# Patient Record
Sex: MALE | Race: WHITE | NOT HISPANIC OR LATINO | Employment: FULL TIME | URBAN - METROPOLITAN AREA
[De-identification: names, ages, dates, MRNs, and addresses within clinical notes are randomized per-mention and may not be internally consistent; named-entity substitution may affect disease eponyms.]

---

## 2020-09-23 ENCOUNTER — OFFICE VISIT CONVERTED (OUTPATIENT)
Dept: SURGERY | Facility: CLINIC | Age: 55
End: 2020-09-23
Attending: NURSE PRACTITIONER

## 2021-01-11 ENCOUNTER — HOSPITAL ENCOUNTER (OUTPATIENT)
Dept: GASTROENTEROLOGY | Facility: HOSPITAL | Age: 56
Setting detail: HOSPITAL OUTPATIENT SURGERY
Discharge: HOME | End: 2021-01-11
Attending: SURGERY

## 2021-01-25 ENCOUNTER — OFFICE VISIT CONVERTED (OUTPATIENT)
Dept: SURGERY | Facility: CLINIC | Age: 56
End: 2021-01-25
Attending: SURGERY

## 2021-01-25 ENCOUNTER — CONVERSION ENCOUNTER (OUTPATIENT)
Dept: SURGERY | Facility: CLINIC | Age: 56
End: 2021-01-25

## 2021-05-10 NOTE — H&P
"   History and Physical      Patient Name: Aly Elise   Patient ID: [id number]   Sex: Male   YOB: 1965    Primary Care Provider: MELISSA SMITH MD   Referring Provider: MELISSA SMITH MD    Visit Date: January 25, 2021    Provider: Jersey Greene MD   Location: [facility]   Location Address: [address] [place] [address]  [id number]   Location Phone: [phone]          Chief Complaint  · Follow Up Office Visit      History Of Present Illness     Mr. Elise came back for follow-up. He had a colonoscopy done about a week ago. We removed some polyps. One of them was fairly large on the left side of the colon. The polyps all came back consistent with tubulovillous adenomas. The one on the left side actually had some high grade dysplasia. He is doing well. Otherwise, he feels fine.       Past Medical History  High blood pressure; High cholesterol; Hyperlipidemia; Hypertension; Kidney stones; Rectal bleeding; Vitamin D Deficiency         Past Surgical History  Kidney Stone Surgery, Unspecified; Tonsillectomy         Medication List  lisinopril 20 mg oral tablet; potassium citrate 15 mEq oral tablet extended release         Allergy List  NO KNOWN DRUG ALLERGIES         Family Medical History  Prostate cancer         Social History  Alcohol; ; Recreational Drug Use; Tobacco (Never)         Review of Systems  · Cardiovascular  o Denies  o : chest pain, irregular heart beats, rapid heart rate, chest pain on exertion, shortness of breath, lower extremity swelling  · Respiratory  o Denies  o : shortness of breath, wheezing, cough, wheezing, chronic cough, coughing up blood  · Gastrointestinal  o Denies  o : nausea, vomiting, diarrhea, chronic abdominal pain, reflux symptoms      Vitals  Date Time BP Position Site L\R Cuff Size HR RR TEMP (F) WT  HT  BMI kg/m2 BSA m2 O2 Sat FR L/min FiO2        01/25/2021 03:04 PM       18  263lbs 0oz 6'  1\" 34.7 2.48             Physical " Examination     Today on physical exam, he appears well. His abdomen is soft.           Assessment  · Postoperative Exam Following Surgery     V67.00       Status post colonoscopy. We did remove some sizeable polyps.       Plan  · Medications  o Medications have been Reconciled     We will have him follow-up in six months. We will schedule him for a short interval colonoscopy just to make sure the left sided polyp has been completely removed. I have described that plan to him and he is fine with that.             Electronically Signed by: Aydee Florez-, -Author on January 25, 2021 04:03:20 PM  Electronically Co-signed by: Jersey Greene MD -Reviewer on January 26, 2021 10:46:52 AM

## 2021-05-10 NOTE — H&P
History and Physical      Patient Name: Aly Elise   Patient ID: [id number]   Sex: Male   YOB: 1965    Primary Care Provider: Randall Colindres MD   Referring Provider: INEZ SMITH MD    Visit Date: September 23, 2020    Provider: FARZANA Pradhan   [facility] [facility]   Location Address: [address] [place] [address]  [id number]   Location Phone: [phone]          Chief Complaint  · Requesting colonoscopy  · Age 50 or over  · FH of colon cancer  · Rectal Bleeding      History Of Present Illness  The patient is a 55 year old /White male presenting to the Surgical Specialist office on a referral from INEZ SMITH MD.   Aly Elise needs to have a diagnostic colonoscopy.   Patient states that they have not had a colonoscopy.   Patient currently complains of: blood in stool and abnormal stool study   Patient Does have family history of colon cancer. Grandfather      Patient presents today on a referral from Dr. Inez Avilez for positive Cologuard.  Patient reports that he is seeing bright red blood per rectum occasionally with bowel movements, there is significant enough that it fills the toilet.  He denies any abdominal pain or change in bowel habit.  Admits to family history of colon cancer with his maternal grandfather.  No previous colonoscopy       Past Medical History  Disease Name Date Onset Notes   High blood pressure --  --    High cholesterol --  --    Hyperlipidemia --  --    Hypertension --  --    Kidney stones --  --    Rectal bleeding --  --    Vitamin D Deficiency --  --          Past Surgical History  Procedure Name Date Notes   Kidney Stone Surgery, Unspecified --  --    Tonsillectomy --  --          Medication List  Name Date Started Instructions   lisinopril 20 mg oral tablet  --    potassium citrate 15 mEq oral tablet extended release  --          Allergy List  Allergen Name Date Reaction Notes   NO KNOWN DRUG ALLERGIES --  --   "--        Allergies Reconciled  Family Medical History  Disease Name Relative/Age Notes   Prostate cancer Father/  Grandfather (paternal)/   Father; Grandfather (paternal)         Social History  Finding Status Start/Stop Quantity Notes   Alcohol --  --/-- --  --     --  --/-- --  --    Recreational Drug Use --  --/-- --  --    Tobacco Never --/-- --  --          Review of Systems  · Constitutional  o Denies  o : fever, chills  · Eyes  o Denies  o : yellowish discoloration of eyes  · HENT  o Denies  o : difficulty swallowing  · Cardiovascular  o Denies  o : chest pain, chest pain on exertion  · Respiratory  o Denies  o : shortness of breath  · Gastrointestinal  o Denies  o : nausea, vomiting, diarrhea, constipation  · Genitourinary  o Denies  o : abnormal color of urine  · Integument  o Denies  o : rash  · Neurologic  o Denies  o : tingling or numbness  · Musculoskeletal  o Denies  o : joint pain  · Endocrine  o Denies  o : weight gain, weight loss      Vitals  Date Time BP Position Site L\R Cuff Size HR RR TEMP (F) WT  HT  BMI kg/m2 BSA m2 O2 Sat        09/23/2020 11:58 AM       12  264lbs 2oz 6'  1\" 34.85 2.48           Physical Examination  · Constitutional  o Appearance  o : well developed, well-nourished, patient in no apparent distress  · Head and Face  o Head  o :   § Inspection  § : atraumatic, normocephalic  o Face  o :   § Inspection  § : no facial lesions  · Eyes  o Conjunctivae  o : conjunctivae normal  o Sclerae  o : sclerae white  · Neck  o Inspection/Palpation  o : normal appearance, no masses or tenderness, trachea midline  · Respiratory  o Respiratory Effort  o : breathing unlabored  · Skin and Subcutaneous Tissue  o General Inspection  o : no lesions present, no areas of discoloration, skin turgor normal, texture normal  · Neurologic  o Mental Status Examination  o :   § Orientation  § : grossly oriented to person, place and time  § Attention  § : attention normal, concentration abilities " normal  § Fund of Knowledge  § : fund of knowledge within normal limits, patient aware of current events  o Gait and Station  o : normal gait, able to stand without difficulty  · Psychiatric  o Judgement and Insight  o : judgment and insight intact  o Mood and Affect  o : mood normal, affect appropriate          Assessment  · Abnormal stool test     792.1/R19.5  · Rectal Bleeding     569.3/K62.5  · Pre-op testing     V72.84/Z01.818    Problems Reconciled  Plan  · Orders  o Consent for Colonoscopy with Possible Biopsy - Possible risks/complications, benefits, and alternatives to surgical or invasive procedure have been explained to patient and/or legal guardian. -Patient has been evaluated and can tolerate anesthesia and/or sedation. Risks, benefits, and alternatives to anesthesia and sedation have been explained to patient and/or legal guardian. (12034) - 792.1/R19.5, 569.3/K62.5, V72.84/Z01.818 - 12/14/2020  o [facility] Pre-Op Covid-19 Screening (98288) - V72.84/Z01.818 - 12/09/2020   [address] @ 9:30am  · Medications  o Medications have been Reconciled  o Transition of Care or Provider Policy  · Instructions  o Surgical Facility: [facility]  o Handouts Provided Pre-Procedure Instructions including date, time, and location of procedure.   o PLAN: Proceeed with colonoscopy. Patient understands risks/benefits and is willing to proceed.   o ***Surgical Orders***  o RISK AND BENEFITS:  o Given these options, the patient has verbally expressed an understanding of the risks of the surgery and finds these risks acceptable. Will proceed with surgery as soon as possible.  o O.R. PREP: Per protocol   o IV: Per Anesthesia  o Please sign permit for: Colonoscopy with possible biopsies by Dr. Greene.  o The above History and Physical Examination has been completed within 30 days of admission.  o ***Patient Status***  o Outpatient  o Follow up in the in the office post procedure.  o Advised patient he would  need COVID-19 testing prior to procedure. Encourage patient to self isolate in between testing and procedure. Patient verbalizes understands willing to proceed  o Electronically Identified Patient Education Materials Provided Electronically            Electronically Signed by: FARZANA Pradhan -Author on September 23, 2020 01:27:23 PM

## 2021-05-14 VITALS — WEIGHT: 263 LBS | RESPIRATION RATE: 18 BRPM | HEIGHT: 73 IN | BODY MASS INDEX: 34.85 KG/M2

## 2021-05-14 VITALS — BODY MASS INDEX: 35 KG/M2 | HEIGHT: 73 IN | WEIGHT: 264.12 LBS | RESPIRATION RATE: 12 BRPM

## 2021-06-15 ENCOUNTER — TELEPHONE (OUTPATIENT)
Dept: SURGERY | Facility: CLINIC | Age: 56
End: 2021-06-15

## 2021-06-15 NOTE — TELEPHONE ENCOUNTER
Patient called to schedule a follow-up colonoscopy in July.  There is a recall in [facility] placed by Raissa that he just needs to schedule the procedure, and that he doesn't need a consultation.

## 2021-06-21 NOTE — TELEPHONE ENCOUNTER
Per Dr. Greene, will you please tell pt he still has to schedule an appt to speak with Dr. Greene before the colonoscopy. Once this is done, will you please send back to me?

## 2021-07-21 RX ORDER — METFORMIN HYDROCHLORIDE 500 MG/1
500 TABLET, EXTENDED RELEASE ORAL DAILY
COMMUNITY
Start: 2021-04-13

## 2021-07-21 RX ORDER — ATENOLOL 25 MG/1
TABLET ORAL
COMMUNITY

## 2021-07-21 RX ORDER — POTASSIUM CITRATE 15 MEQ/1
TABLET, EXTENDED RELEASE ORAL
Status: ON HOLD | COMMUNITY
End: 2021-12-06

## 2021-07-21 RX ORDER — POTASSIUM CITRATE 5 MEQ/1
TABLET, EXTENDED RELEASE ORAL
COMMUNITY
Start: 2021-05-19

## 2021-07-21 RX ORDER — ERGOCALCIFEROL 1.25 MG/1
CAPSULE ORAL
Status: ON HOLD | COMMUNITY
End: 2021-12-06

## 2021-07-21 RX ORDER — ATORVASTATIN CALCIUM 40 MG/1
40 TABLET, FILM COATED ORAL DAILY
COMMUNITY
Start: 2021-04-13

## 2021-07-21 RX ORDER — LISINOPRIL 20 MG/1
TABLET ORAL
Status: ON HOLD | COMMUNITY
End: 2021-12-06

## 2021-07-26 ENCOUNTER — OFFICE VISIT (OUTPATIENT)
Dept: SURGERY | Facility: CLINIC | Age: 56
End: 2021-07-26

## 2021-07-26 VITALS — WEIGHT: 254.6 LBS | BODY MASS INDEX: 33.74 KG/M2 | HEIGHT: 73 IN | RESPIRATION RATE: 14 BRPM

## 2021-07-26 DIAGNOSIS — Z12.11 COLON CANCER SCREENING: Primary | ICD-10-CM

## 2021-07-26 DIAGNOSIS — Z86.010 HX OF ADENOMATOUS COLONIC POLYPS: ICD-10-CM

## 2021-07-26 PROBLEM — I10 HIGH BLOOD PRESSURE: Status: ACTIVE | Noted: 2021-07-26

## 2021-07-26 PROBLEM — E11.9 TYPE 2 DIABETES MELLITUS WITHOUT COMPLICATION, WITHOUT LONG-TERM CURRENT USE OF INSULIN: Status: ACTIVE | Noted: 2021-05-13

## 2021-07-26 PROBLEM — E66.09 CLASS 1 OBESITY DUE TO EXCESS CALORIES WITH SERIOUS COMORBIDITY AND BODY MASS INDEX (BMI) OF 34.0 TO 34.9 IN ADULT: Status: ACTIVE | Noted: 2021-05-13

## 2021-07-26 PROBLEM — K62.5 RECTAL BLEEDING: Status: ACTIVE | Noted: 2021-07-26

## 2021-07-26 PROBLEM — N13.2 HYDRONEPHROSIS WITH RENAL CALCULOUS OBSTRUCTION: Status: ACTIVE | Noted: 2020-07-22

## 2021-07-26 PROBLEM — E55.9 VITAMIN D DEFICIENCY: Status: ACTIVE | Noted: 2021-07-26

## 2021-07-26 PROBLEM — E78.5 HYPERLIPIDEMIA: Status: ACTIVE | Noted: 2021-07-26

## 2021-07-26 PROBLEM — N20.0 KIDNEY STONES: Status: ACTIVE | Noted: 2021-07-26

## 2021-07-26 PROBLEM — I10 ESSENTIAL HYPERTENSION: Status: ACTIVE | Noted: 2021-05-13

## 2021-07-26 PROBLEM — E78.00 HIGH CHOLESTEROL: Status: ACTIVE | Noted: 2021-07-26

## 2021-07-26 PROCEDURE — S0285 CNSLT BEFORE SCREEN COLONOSC: HCPCS | Performed by: SURGERY

## 2021-07-26 RX ORDER — LISINOPRIL 10 MG/1
10 TABLET ORAL DAILY
COMMUNITY

## 2021-07-26 RX ORDER — LOSARTAN POTASSIUM 25 MG/1
25 TABLET ORAL DAILY
COMMUNITY
Start: 2021-07-14

## 2021-07-26 RX ORDER — AMLODIPINE BESYLATE 10 MG/1
10 TABLET ORAL DAILY
COMMUNITY
Start: 2021-07-14

## 2021-07-26 RX ORDER — AMLODIPINE BESYLATE 10 MG/1
TABLET ORAL
Status: ON HOLD | COMMUNITY
Start: 2021-03-09 | End: 2021-12-06

## 2021-07-26 NOTE — PROGRESS NOTES
Inpatient History and Physical Surgical Orders    Preadmission Location:   Preadmission Time:  Facility:  Surgery Date:  Surgery Time:  Preadmission Test date:     Chief Complaint  Outpatient History and Physical / Surgical Orders    Primary Care Provider: Inez Engel APRN    Referring Provider: No ref. provider found    Subjective      Patient Name: Aly Elise : 1965    HPI  Patient is a 55-year-old gentleman who is about 6 months out status post a colonoscopy with polypectomy.  He had a large sessile polyp removed piecemeal from the right colon and this came back consistent with a tubular villous adenoma with some high-grade dysplasia.  He is doing fine but we wanted to bring him back for short interval colonoscopy just to make sure that the polyps been completely excised.    Past History:  Medical History: has a past medical history of High blood pressure, High cholesterol, Hyperlipidemia, Hypertension, Kidney stones, Rectal bleeding, and Vitamin D deficiency.   Surgical History: has a past surgical history that includes Kidney stone surgery and Tonsillectomy.   Family History: family history includes Prostate cancer in his father and paternal grandfather.   Social History: reports that he has never smoked. He does not have any smokeless tobacco history on file.  Allergies: Patient has no known allergies.       Current Outpatient Medications:   •  amLODIPine (NORVASC) 10 MG tablet, , Disp: , Rfl:   •  atenolol (TENORMIN) 25 MG tablet, atenolol 25 mg oral tablet take 0.5 tablet by oral route 2 times a day   Suspended, Disp: , Rfl:   •  atorvastatin (LIPITOR) 40 MG tablet, Take 40 mg by mouth Daily., Disp: , Rfl:   •  ergocalciferol (ERGOCALCIFEROL) 1.25 MG (06967 UT) capsule, Vitamin D2 50,000 unit oral capsule 1 po every 2 weeks   Suspended, Disp: , Rfl:   •  lisinopril (PRINIVIL,ZESTRIL) 20 MG tablet, , Disp: , Rfl:   •  losartan (COZAAR) 25 MG tablet, , Disp: , Rfl:   •  metFORMIN ER  "(GLUCOPHAGE-XR) 500 MG 24 hr tablet, Take 500 mg by mouth Daily., Disp: , Rfl:   •  potassium citrate (UROCIT-K) 5 MEQ (540 MG) CR tablet, TAKE 2 TABLETS (10 MEQ TOTAL) BY MOUTH TWO TIMES DAILY (GENERIC FOR UROCIT-K 540MG 5MEQ), Disp: , Rfl:   •  amLODIPine (NORVASC) 10 MG tablet, , Disp: , Rfl:   •  lisinopril (PRINIVIL,ZESTRIL) 10 MG tablet, Take 10 mg by mouth Daily., Disp: , Rfl:   •  Potassium Citrate ER 15 MEQ (1620 MG) tablet controlled-release, , Disp: , Rfl:        Objective   Vital Signs:   Resp 14   Ht 185.4 cm (73\")   Wt 115 kg (254 lb 9.6 oz)   BMI 33.59 kg/m²       Physical Exam  Vitals and nursing note reviewed.   Constitutional:       Appearance: Normal appearance. He is well-developed.   Cardiovascular:      Rate and Rhythm: Normal rate and regular rhythm.   Pulmonary:      Effort: Pulmonary effort is normal.      Breath sounds: Normal air entry.   Abdominal:      General: Bowel sounds are normal.      Palpations: Abdomen is soft.      Skin:     General: Skin is warm and dry.   Neurological:      Mental Status: He is alert and oriented to person, place, and time.      Motor: Motor function is intact.   Psychiatric:         Mood and Affect: Mood normal.       Result Review :               Assessment and Plan   Diagnoses and all orders for this visit:    1. Hx of adenomatous colonic polyps (Primary)    We will set him up for repeat colonoscopy.  I described the procedure to him as well as the risk and benefits and he is agreeable to proceeding.    I  Jersey Greene MD  07/26/2021    "

## 2021-10-06 ENCOUNTER — TELEPHONE (OUTPATIENT)
Dept: SURGERY | Facility: CLINIC | Age: 56
End: 2021-10-06

## 2021-12-02 NOTE — PRE-PROCEDURE INSTRUCTIONS
Called patient and discussed instructions for laxative and skin prep. Discussed clear liquid diet and pre-op medications. Patient take all of his meds at night time and aware to hold his metformin Den night. Patient stated understanding. No other issues or concerns noted currently per patient.   Blood glucose ordered.  Patient is vaccinated for covid-19.

## 2021-12-06 ENCOUNTER — ANESTHESIA (OUTPATIENT)
Dept: GASTROENTEROLOGY | Facility: HOSPITAL | Age: 56
End: 2021-12-06

## 2021-12-06 ENCOUNTER — HOSPITAL ENCOUNTER (OUTPATIENT)
Facility: HOSPITAL | Age: 56
Setting detail: HOSPITAL OUTPATIENT SURGERY
Discharge: HOME | End: 2021-12-06
Attending: SURGERY | Admitting: SURGERY

## 2021-12-06 ENCOUNTER — ANESTHESIA EVENT (OUTPATIENT)
Dept: GASTROENTEROLOGY | Facility: HOSPITAL | Age: 56
End: 2021-12-06

## 2021-12-06 VITALS
BODY MASS INDEX: 34.48 KG/M2 | WEIGHT: 260.14 LBS | TEMPERATURE: 98.1 F | RESPIRATION RATE: 18 BRPM | HEIGHT: 73 IN | OXYGEN SATURATION: 99 % | DIASTOLIC BLOOD PRESSURE: 78 MMHG | SYSTOLIC BLOOD PRESSURE: 140 MMHG | HEART RATE: 65 BPM

## 2021-12-06 DIAGNOSIS — Z12.11 COLON CANCER SCREENING: ICD-10-CM

## 2021-12-06 DIAGNOSIS — Z86.010 HX OF ADENOMATOUS COLONIC POLYPS: ICD-10-CM

## 2021-12-06 LAB — GLUCOSE BLDC GLUCOMTR-MCNC: 138 MG/DL (ref 70–99)

## 2021-12-06 PROCEDURE — 25010000002 PROPOFOL 10 MG/ML EMULSION: Performed by: NURSE ANESTHETIST, CERTIFIED REGISTERED

## 2021-12-06 PROCEDURE — 82962 GLUCOSE BLOOD TEST: CPT

## 2021-12-06 PROCEDURE — 88305 TISSUE EXAM BY PATHOLOGIST: CPT | Performed by: SURGERY

## 2021-12-06 RX ORDER — SODIUM CHLORIDE, SODIUM LACTATE, POTASSIUM CHLORIDE, CALCIUM CHLORIDE 600; 310; 30; 20 MG/100ML; MG/100ML; MG/100ML; MG/100ML
30 INJECTION, SOLUTION INTRAVENOUS CONTINUOUS
Status: DISCONTINUED | OUTPATIENT
Start: 2021-12-06 | End: 2021-12-06 | Stop reason: HOSPADM

## 2021-12-06 RX ORDER — SODIUM CHLORIDE, SODIUM LACTATE, POTASSIUM CHLORIDE, CALCIUM CHLORIDE 600; 310; 30; 20 MG/100ML; MG/100ML; MG/100ML; MG/100ML
INJECTION, SOLUTION INTRAVENOUS CONTINUOUS PRN
Status: DISCONTINUED | OUTPATIENT
Start: 2021-12-06 | End: 2021-12-06 | Stop reason: SURG

## 2021-12-06 RX ORDER — ONDANSETRON 4 MG/1
4 TABLET, FILM COATED ORAL ONCE AS NEEDED
Status: DISCONTINUED | OUTPATIENT
Start: 2021-12-06 | End: 2021-12-06 | Stop reason: HOSPADM

## 2021-12-06 RX ORDER — PROPOFOL 10 MG/ML
VIAL (ML) INTRAVENOUS AS NEEDED
Status: DISCONTINUED | OUTPATIENT
Start: 2021-12-06 | End: 2021-12-06 | Stop reason: SURG

## 2021-12-06 RX ORDER — ONDANSETRON 2 MG/ML
4 INJECTION INTRAMUSCULAR; INTRAVENOUS ONCE AS NEEDED
Status: DISCONTINUED | OUTPATIENT
Start: 2021-12-06 | End: 2021-12-06 | Stop reason: HOSPADM

## 2021-12-06 RX ORDER — LIDOCAINE HYDROCHLORIDE 20 MG/ML
INJECTION, SOLUTION INFILTRATION; PERINEURAL AS NEEDED
Status: DISCONTINUED | OUTPATIENT
Start: 2021-12-06 | End: 2021-12-06 | Stop reason: SURG

## 2021-12-06 RX ADMIN — PROPOFOL 200 MCG/KG/MIN: 10 INJECTION, EMULSION INTRAVENOUS at 08:15

## 2021-12-06 RX ADMIN — SODIUM CHLORIDE, POTASSIUM CHLORIDE, SODIUM LACTATE AND CALCIUM CHLORIDE: 600; 310; 30; 20 INJECTION, SOLUTION INTRAVENOUS at 08:10

## 2021-12-06 RX ADMIN — SODIUM CHLORIDE, POTASSIUM CHLORIDE, SODIUM LACTATE AND CALCIUM CHLORIDE 30 ML/HR: 600; 310; 30; 20 INJECTION, SOLUTION INTRAVENOUS at 07:10

## 2021-12-06 RX ADMIN — LIDOCAINE HYDROCHLORIDE 100 MG: 20 INJECTION, SOLUTION INFILTRATION; PERINEURAL at 08:15

## 2021-12-06 RX ADMIN — PROPOFOL 100 MG: 10 INJECTION, EMULSION INTRAVENOUS at 08:15

## 2021-12-06 NOTE — H&P
[facility]   HISTORY AND PHYSICAL    Patient Name: Aly Elise  : 1965  MRN: [id number]  Primary Care Physician:  Inez Engel APRN  Date of admission: 2021    Subjective   Subjective     Chief Complaint: Colon screening    HPI:    Aly Elise is a 56 y.o. male who presents for short interval colonoscopy for colon screening.    Review of Systems   Respiratory: Negative for shortness of breath.    Cardiovascular: Negative for chest pain.       Personal History     Past Medical History:   Diagnosis Date   • Diabetes mellitus (HCC)    • High blood pressure    • High cholesterol    • Hyperlipidemia    • Hypertension    • Kidney stones    • Rectal bleeding    • Vitamin D deficiency        Past Surgical History:   Procedure Laterality Date   • KIDNEY STONE SURGERY     • TONSILLECTOMY         Family History: family history includes Prostate cancer in his father and paternal grandfather. Otherwise pertinent FHx was reviewed and not pertinent to current issue.    Social History:  reports that he has never smoked. He has never used smokeless tobacco. He reports that he does not drink alcohol and does not use drugs.    Home Medications:  amLODIPine, atenolol, atorvastatin, lisinopril, losartan, metFORMIN ER, and potassium citrate    Allergies:  No Known Allergies    Objective    Objective     Vitals:   Temp:  [98.1 °F (36.7 °C)] 98.1 °F (36.7 °C)  Heart Rate:  [68] 68  Resp:  [18] 18  BP: (136)/(85) 136/85    Physical Exam  HENT:      Head: Normocephalic.   Cardiovascular:      Rate and Rhythm: Normal rate.   Pulmonary:      Effort: Pulmonary effort is normal.   Abdominal:      Palpations: Abdomen is soft.   Musculoskeletal:      Cervical back: Normal range of motion.   Skin:     General: Skin is warm.   Neurological:      General: No focal deficit present.      Mental Status: He is alert.   Psychiatric:         Mood and Affect: Mood normal.         Result Review    Result Review:  I have  personally reviewed the results from the time of this admission to 12/6/2021 07:22 EST and agree with these findings:  []  Laboratory  []  Microbiology  []  Radiology  []  EKG/Telemetry   []  Cardiology/Vascular   []  Pathology  []  Old records  []  Other:  Most notable findings include:     Assessment/Plan   Assessment / Plan     Brief Patient Summary:  Aly Elise is a 56 y.o. male who presents for screening colonoscopy.    Active Hospital Problems:  Active Hospital Problems    Diagnosis    • Hx of adenomatous colonic polyps    • Colon cancer screening      Added automatically from request for surgery [id number]         Plan:   Will proceed with colonoscopy.  R/B/A's explained.    DVT prophylaxis:  No DVT prophylaxis order currently exists.    CODE STATUS:         Admission Status:  I believe this patient meets outpatient. status.    Electronically signed by Jersey Greene MD, 12/06/21, 7:22 AM EST.

## 2021-12-06 NOTE — ANESTHESIA PREPROCEDURE EVALUATION
Anesthesia Evaluation     Patient summary reviewed and Nursing notes reviewed                Airway   Mallampati: I  TM distance: >3 FB  Neck ROM: full  No difficulty expected  Dental      Pulmonary - negative pulmonary ROS and normal exam    breath sounds clear to auscultation  Cardiovascular - normal exam    Rhythm: regular    (+) hypertension, hyperlipidemia,       Neuro/Psych- negative ROS  GI/Hepatic/Renal/Endo    (+) obesity,  GI bleeding , renal disease, diabetes mellitus,     Musculoskeletal (-) negative ROS    Abdominal    Substance History - negative use     OB/GYN negative ob/gyn ROS         Other                        Anesthesia Plan    ASA 3     general     intravenous induction     Anesthetic plan, all risks, benefits, and alternatives have been provided, discussed and informed consent has been obtained with: patient.

## 2021-12-06 NOTE — ANESTHESIA POSTPROCEDURE EVALUATION
Patient: Aly Elise    Procedure Summary     Date: 12/06/21 Room / Location: [facility] ENDOSCOPY 3 / [facility] ENDOSCOPY    Anesthesia Start: 0810 Anesthesia Stop: 0833    Procedure: COLONOSCOPY WITH POLYPECTOMY/SNARE (N/A ) Diagnosis:       Hx of adenomatous colonic polyps      Colon cancer screening      (Hx of adenomatous colonic polyps [Z86.010])      (Colon cancer screening [Z12.11])    Surgeons: Jersey Greene MD Provider: Antony Slater MD    Anesthesia Type: general ASA Status: 3          Anesthesia Type: general    Vitals  Vitals Value Taken Time   /76 12/06/21 0847   Temp     Pulse 62 12/06/21 0847   Resp 16 12/06/21 0845   SpO2 93 % 12/06/21 0847   Vitals shown include unvalidated device data.        Post Anesthesia Care and Evaluation    Patient location during evaluation: bedside  Patient participation: complete - patient participated  Level of consciousness: awake  Pain management: adequate  Airway patency: patent  Anesthetic complications: No anesthetic complications  PONV Status: none  Cardiovascular status: acceptable  Respiratory status: acceptable  Hydration status: acceptable    Comments: An Anesthesiologist personally participated in the most demanding procedures (including induction and emergence if applicable) in the anesthesia plan, monitored the course of anesthesia administration at frequent intervals and remained physically present and available for immediate diagnosis and treatment of emergencies.

## 2021-12-07 LAB
CYTO UR: NORMAL
LAB AP CASE REPORT: NORMAL
LAB AP CLINICAL INFORMATION: NORMAL
PATH REPORT.FINAL DX SPEC: NORMAL
PATH REPORT.GROSS SPEC: NORMAL

## 2021-12-28 ENCOUNTER — OFFICE VISIT (OUTPATIENT)
Dept: SURGERY | Facility: CLINIC | Age: 56
End: 2021-12-28

## 2021-12-28 VITALS — RESPIRATION RATE: 16 BRPM | BODY MASS INDEX: 34.48 KG/M2 | HEIGHT: 73 IN | WEIGHT: 260.14 LBS

## 2021-12-28 DIAGNOSIS — Z86.010 HX OF ADENOMATOUS COLONIC POLYPS: Primary | ICD-10-CM

## 2021-12-28 PROCEDURE — 99212 OFFICE O/P EST SF 10 MIN: CPT | Performed by: SURGERY

## 2021-12-28 NOTE — PROGRESS NOTES
Chief Complaint  Follow-up    Subjective          Aly Elise presents to [facility] GENERAL SURGERY  History of Present Illness    Aly Elise is a 56 y.o. male  who presents today for a postoperative visit.     Patient is here for a follow-up after a recent colonoscopy.  We found 2 small polyps that were removed that came back consistent with a tubular adenoma and a small sessile serrated lesion.    Past History:  Medical History: has a past medical history of Diabetes mellitus (HCC), High blood pressure, High cholesterol, Hyperlipidemia, Hypertension, Kidney stones, Rectal bleeding, and Vitamin D deficiency.   Surgical History: has a past surgical history that includes Kidney stone surgery; Tonsillectomy; and Colonoscopy (N/A, 12/6/2021).   Family History: family history includes Prostate cancer in his father and paternal grandfather.   Social History: reports that he has never smoked. He has never used smokeless tobacco. He reports that he does not drink alcohol and does not use drugs.  Allergies: Patient has no known allergies.       Current Outpatient Medications:   •  amLODIPine (NORVASC) 10 MG tablet, Take 10 mg by mouth Daily., Disp: , Rfl:   •  atenolol (TENORMIN) 25 MG tablet, atenolol 25 mg oral tablet take 0.5 tablet by oral route 2 times a day   Suspended, Disp: , Rfl:   •  atorvastatin (LIPITOR) 40 MG tablet, Take 40 mg by mouth Daily., Disp: , Rfl:   •  lisinopril (PRINIVIL,ZESTRIL) 10 MG tablet, Take 10 mg by mouth Daily., Disp: , Rfl:   •  losartan (COZAAR) 25 MG tablet, Take 25 mg by mouth Daily., Disp: , Rfl:   •  metFORMIN ER (GLUCOPHAGE-XR) 500 MG 24 hr tablet, Take 500 mg by mouth Daily., Disp: , Rfl:   •  potassium citrate (UROCIT-K) 5 MEQ (540 MG) CR tablet, TAKE 2 TABLETS (10 MEQ TOTAL) BY MOUTH TWO TIMES DAILY (GENERIC FOR UROCIT-K 540MG 5MEQ), Disp: , Rfl:        Physical Exam  He appears well today and his abdomen is soft  Objective     Vital Signs:   Resp 16   " Ht 185.4 cm (72.99\")   Wt 118 kg (260 lb 2.3 oz)   BMI 34.33 kg/m²              Assessment and Plan    Diagnoses and all orders for this visit:    1. Hx of adenomatous colonic polyps (Primary)    We will see him back in 5 years and repeat a colonoscopy at that time.  I have asked him to call me in the meantime should he have any problems.      "

## 2023-01-03 ENCOUNTER — APPOINTMENT (OUTPATIENT)
Dept: CT IMAGING | Facility: HOSPITAL | Age: 58
End: 2023-01-03
Payer: COMMERCIAL

## 2023-01-03 ENCOUNTER — APPOINTMENT (OUTPATIENT)
Dept: GENERAL RADIOLOGY | Facility: HOSPITAL | Age: 58
End: 2023-01-03
Payer: COMMERCIAL

## 2023-01-03 ENCOUNTER — HOSPITAL ENCOUNTER (EMERGENCY)
Facility: HOSPITAL | Age: 58
Discharge: HOME | End: 2023-01-03
Attending: EMERGENCY MEDICINE | Admitting: EMERGENCY MEDICINE
Payer: COMMERCIAL

## 2023-01-03 VITALS
OXYGEN SATURATION: 99 % | WEIGHT: 274.69 LBS | DIASTOLIC BLOOD PRESSURE: 91 MMHG | TEMPERATURE: 98 F | HEIGHT: 73 IN | SYSTOLIC BLOOD PRESSURE: 150 MMHG | RESPIRATION RATE: 20 BRPM | BODY MASS INDEX: 36.41 KG/M2 | HEART RATE: 88 BPM
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
## 2023-01-03 DIAGNOSIS — S50.811A ABRASION OF RIGHT FOREARM, INITIAL ENCOUNTER: ICD-10-CM

## 2023-01-03 DIAGNOSIS — S01.01XA LACERATION OF SCALP, INITIAL ENCOUNTER: ICD-10-CM

## 2023-01-03 DIAGNOSIS — S09.90XA CLOSED HEAD INJURY, INITIAL ENCOUNTER: ICD-10-CM

## 2023-01-03 DIAGNOSIS — M79.642 LEFT HAND PAIN: ICD-10-CM

## 2023-01-03 LAB
HOLD SPECIMEN: NORMAL
HOLD SPECIMEN: NORMAL
WHOLE BLOOD HOLD COAG: NORMAL
WHOLE BLOOD HOLD SPECIMEN: NORMAL

## 2023-01-03 PROCEDURE — 73090 X-RAY EXAM OF FOREARM: CPT

## 2023-01-03 PROCEDURE — 0 LIDOCAINE 1 % SOLUTION: Performed by: NURSE PRACTITIONER

## 2023-01-03 PROCEDURE — 72125 CT NECK SPINE W/O DYE: CPT

## 2023-01-03 PROCEDURE — 73130 X-RAY EXAM OF HAND: CPT

## 2023-01-03 PROCEDURE — 70450 CT HEAD/BRAIN W/O DYE: CPT

## 2023-01-03 PROCEDURE — 71046 X-RAY EXAM CHEST 2 VIEWS: CPT

## 2023-01-03 PROCEDURE — 99283 EMERGENCY DEPT VISIT LOW MDM: CPT

## 2023-01-03 RX ORDER — ACETAMINOPHEN 500 MG
1000 TABLET ORAL ONCE
Status: COMPLETED | OUTPATIENT
Start: 2023-01-03 | End: 2023-01-03

## 2023-01-03 RX ORDER — SODIUM CHLORIDE 0.9 % (FLUSH) 0.9 %
10 SYRINGE (ML) INJECTION AS NEEDED
Status: DISCONTINUED | OUTPATIENT
Start: 2023-01-03 | End: 2023-01-03 | Stop reason: HOSPADM

## 2023-01-03 RX ORDER — LIDOCAINE HYDROCHLORIDE 10 MG/ML
10 INJECTION, SOLUTION INFILTRATION; PERINEURAL ONCE
Status: COMPLETED | OUTPATIENT
Start: 2023-01-03 | End: 2023-01-03

## 2023-01-03 RX ADMIN — ACETAMINOPHEN 1000 MG: 500 TABLET, FILM COATED ORAL at 09:57

## 2023-01-03 RX ADMIN — LIDOCAINE HYDROCHLORIDE 10 ML: 10 INJECTION, SOLUTION INFILTRATION; PERINEURAL at 11:08

## 2023-01-03 NOTE — DISCHARGE INSTRUCTIONS
You should expect to be sore for several days.  You can take Tylenol or Ibuprofen for pain.  Have staples removed in 5-7 days.  Return to ED for severe headache, altered mental status, vision changes, vomiting, or any other concerns.
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
## 2023-01-03 NOTE — ED NOTES
Pt ambulated to restroom and he had asked for tylenol for headache/pain. Tylenol order obtained and was given orally.

## 2023-01-03 NOTE — ED NOTES
Pt discharged home, he ambulated out of ED wound care discussed with patient and family and they verbalized understanding of all.

## 2024-05-09 ENCOUNTER — APPOINTMENT (OUTPATIENT)
Dept: CT IMAGING | Facility: HOSPITAL | Age: 59
End: 2024-05-09
Payer: COMMERCIAL

## 2024-05-09 ENCOUNTER — APPOINTMENT (OUTPATIENT)
Dept: GENERAL RADIOLOGY | Facility: HOSPITAL | Age: 59
End: 2024-05-09
Payer: COMMERCIAL

## 2024-05-09 ENCOUNTER — HOSPITAL ENCOUNTER (OUTPATIENT)
Facility: HOSPITAL | Age: 59
Setting detail: OBSERVATION
Discharge: HOME | End: 2024-05-10
Attending: EMERGENCY MEDICINE | Admitting: STUDENT IN AN ORGANIZED HEALTH CARE EDUCATION/TRAINING PROGRAM
Payer: COMMERCIAL

## 2024-05-09 DIAGNOSIS — M48.9 CERVICAL SPINE DISEASE: ICD-10-CM

## 2024-05-09 DIAGNOSIS — G45.9 TIA (TRANSIENT ISCHEMIC ATTACK): Primary | ICD-10-CM

## 2024-05-09 DIAGNOSIS — R26.2 DIFFICULTY IN WALKING: ICD-10-CM

## 2024-05-09 LAB
ALBUMIN SERPL-MCNC: 4.3 G/DL (ref 3.5–5.2)
ALBUMIN/GLOB SERPL: 1.5 G/DL
ALP SERPL-CCNC: 85 U/L (ref 39–117)
ALT SERPL W P-5'-P-CCNC: 35 U/L (ref 1–41)
ANION GAP SERPL CALCULATED.3IONS-SCNC: 11.4 MMOL/L (ref 5–15)
AST SERPL-CCNC: 25 U/L (ref 1–40)
BASOPHILS # BLD AUTO: 0.07 10*3/MM3 (ref 0–0.2)
BASOPHILS NFR BLD AUTO: 0.9 % (ref 0–1.5)
BILIRUB SERPL-MCNC: 0.6 MG/DL (ref 0–1.2)
BILIRUB UR QL STRIP: NEGATIVE
BUN SERPL-MCNC: 14 MG/DL (ref 6–20)
BUN/CREAT SERPL: 15.1 (ref 7–25)
CALCIUM SPEC-SCNC: 9.5 MG/DL (ref 8.6–10.5)
CHLORIDE SERPL-SCNC: 104 MMOL/L (ref 98–107)
CLARITY UR: CLEAR
CO2 SERPL-SCNC: 24.6 MMOL/L (ref 22–29)
COLOR UR: YELLOW
CREAT SERPL-MCNC: 0.93 MG/DL (ref 0.76–1.27)
DEPRECATED RDW RBC AUTO: 36.7 FL (ref 37–54)
EGFRCR SERPLBLD CKD-EPI 2021: 95.8 ML/MIN/1.73
EOSINOPHIL # BLD AUTO: 0.14 10*3/MM3 (ref 0–0.4)
EOSINOPHIL NFR BLD AUTO: 1.7 % (ref 0.3–6.2)
ERYTHROCYTE [DISTWIDTH] IN BLOOD BY AUTOMATED COUNT: 12.1 % (ref 12.3–15.4)
GLOBULIN UR ELPH-MCNC: 2.8 GM/DL
GLUCOSE SERPL-MCNC: 125 MG/DL (ref 65–99)
GLUCOSE UR STRIP-MCNC: ABNORMAL MG/DL
HCT VFR BLD AUTO: 40.6 % (ref 37.5–51)
HGB BLD-MCNC: 14.3 G/DL (ref 13–17.7)
HGB UR QL STRIP.AUTO: NEGATIVE
HOLD SPECIMEN: NORMAL
HOLD SPECIMEN: NORMAL
IMM GRANULOCYTES # BLD AUTO: 0.01 10*3/MM3 (ref 0–0.05)
IMM GRANULOCYTES NFR BLD AUTO: 0.1 % (ref 0–0.5)
KETONES UR QL STRIP: ABNORMAL
LEUKOCYTE ESTERASE UR QL STRIP.AUTO: NEGATIVE
LYMPHOCYTES # BLD AUTO: 2.81 10*3/MM3 (ref 0.7–3.1)
LYMPHOCYTES NFR BLD AUTO: 34.9 % (ref 19.6–45.3)
MAGNESIUM SERPL-MCNC: 1.9 MG/DL (ref 1.6–2.6)
MCH RBC QN AUTO: 29.7 PG (ref 26.6–33)
MCHC RBC AUTO-ENTMCNC: 35.2 G/DL (ref 31.5–35.7)
MCV RBC AUTO: 84.2 FL (ref 79–97)
MONOCYTES # BLD AUTO: 0.68 10*3/MM3 (ref 0.1–0.9)
MONOCYTES NFR BLD AUTO: 8.4 % (ref 5–12)
NEUTROPHILS NFR BLD AUTO: 4.34 10*3/MM3 (ref 1.7–7)
NEUTROPHILS NFR BLD AUTO: 54 % (ref 42.7–76)
NITRITE UR QL STRIP: NEGATIVE
NRBC BLD AUTO-RTO: 0 /100 WBC (ref 0–0.2)
PH UR STRIP.AUTO: 6.5 [PH] (ref 5–8)
PLATELET # BLD AUTO: 308 10*3/MM3 (ref 140–450)
PMV BLD AUTO: 9.5 FL (ref 6–12)
POTASSIUM SERPL-SCNC: 3.6 MMOL/L (ref 3.5–5.2)
PROT SERPL-MCNC: 7.1 G/DL (ref 6–8.5)
PROT UR QL STRIP: NEGATIVE
RBC # BLD AUTO: 4.82 10*6/MM3 (ref 4.14–5.8)
SODIUM SERPL-SCNC: 140 MMOL/L (ref 136–145)
SP GR UR STRIP: 1.02 (ref 1–1.03)
TROPONIN T SERPL HS-MCNC: <6 NG/L
UROBILINOGEN UR QL STRIP: ABNORMAL
WBC NRBC COR # BLD AUTO: 8.05 10*3/MM3 (ref 3.4–10.8)
WHOLE BLOOD HOLD COAG: NORMAL
WHOLE BLOOD HOLD SPECIMEN: NORMAL

## 2024-05-09 PROCEDURE — 70498 CT ANGIOGRAPHY NECK: CPT

## 2024-05-09 PROCEDURE — 93010 ELECTROCARDIOGRAM REPORT: CPT | Performed by: INTERNAL MEDICINE

## 2024-05-09 PROCEDURE — 36415 COLL VENOUS BLD VENIPUNCTURE: CPT

## 2024-05-09 PROCEDURE — G0378 HOSPITAL OBSERVATION PER HR: HCPCS

## 2024-05-09 PROCEDURE — 70496 CT ANGIOGRAPHY HEAD: CPT

## 2024-05-09 PROCEDURE — 99285 EMERGENCY DEPT VISIT HI MDM: CPT

## 2024-05-09 PROCEDURE — 71045 X-RAY EXAM CHEST 1 VIEW: CPT

## 2024-05-09 PROCEDURE — 80053 COMPREHEN METABOLIC PANEL: CPT | Performed by: EMERGENCY MEDICINE

## 2024-05-09 PROCEDURE — 81003 URINALYSIS AUTO W/O SCOPE: CPT | Performed by: EMERGENCY MEDICINE

## 2024-05-09 PROCEDURE — 84484 ASSAY OF TROPONIN QUANT: CPT | Performed by: EMERGENCY MEDICINE

## 2024-05-09 PROCEDURE — 93005 ELECTROCARDIOGRAM TRACING: CPT

## 2024-05-09 PROCEDURE — 25510000001 IOPAMIDOL PER 1 ML: Performed by: EMERGENCY MEDICINE

## 2024-05-09 PROCEDURE — 93005 ELECTROCARDIOGRAM TRACING: CPT | Performed by: EMERGENCY MEDICINE

## 2024-05-09 PROCEDURE — 85025 COMPLETE CBC W/AUTO DIFF WBC: CPT | Performed by: EMERGENCY MEDICINE

## 2024-05-09 PROCEDURE — 70450 CT HEAD/BRAIN W/O DYE: CPT

## 2024-05-09 PROCEDURE — 83735 ASSAY OF MAGNESIUM: CPT | Performed by: EMERGENCY MEDICINE

## 2024-05-09 RX ORDER — SENNOSIDES 8.6 MG
650 CAPSULE ORAL DAILY
COMMUNITY

## 2024-05-09 RX ORDER — FAMOTIDINE 20 MG/1
20 TABLET, FILM COATED ORAL 2 TIMES DAILY
COMMUNITY
Start: 2023-06-12 | End: 2024-06-12

## 2024-05-09 RX ORDER — CETIRIZINE HYDROCHLORIDE 10 MG/1
10 TABLET ORAL DAILY
COMMUNITY

## 2024-05-09 RX ORDER — SODIUM CHLORIDE 0.9 % (FLUSH) 0.9 %
10 SYRINGE (ML) INJECTION AS NEEDED
Status: DISCONTINUED | OUTPATIENT
Start: 2024-05-09 | End: 2024-05-10 | Stop reason: HOSPADM

## 2024-05-09 RX ORDER — SEMAGLUTIDE 0.68 MG/ML
0.25 INJECTION, SOLUTION SUBCUTANEOUS WEEKLY
COMMUNITY

## 2024-05-09 RX ADMIN — IOPAMIDOL 90 ML: 755 INJECTION, SOLUTION INTRAVENOUS at 22:51

## 2024-05-10 ENCOUNTER — APPOINTMENT (OUTPATIENT)
Dept: MRI IMAGING | Facility: HOSPITAL | Age: 59
End: 2024-05-10
Payer: COMMERCIAL

## 2024-05-10 ENCOUNTER — APPOINTMENT (OUTPATIENT)
Dept: CARDIOLOGY | Facility: HOSPITAL | Age: 59
End: 2024-05-10
Payer: COMMERCIAL

## 2024-05-10 VITALS
OXYGEN SATURATION: 95 % | BODY MASS INDEX: 35.5 KG/M2 | WEIGHT: 267.86 LBS | SYSTOLIC BLOOD PRESSURE: 135 MMHG | TEMPERATURE: 97.7 F | RESPIRATION RATE: 20 BRPM | DIASTOLIC BLOOD PRESSURE: 91 MMHG | HEIGHT: 73 IN | HEART RATE: 61 BPM

## 2024-05-10 PROBLEM — K21.9 GASTROESOPHAGEAL REFLUX DISEASE: Status: ACTIVE | Noted: 2023-06-26

## 2024-05-10 PROBLEM — R20.0 LEFT SIDED NUMBNESS: Status: ACTIVE | Noted: 2024-05-10

## 2024-05-10 PROBLEM — R20.0 LEFT SIDED NUMBNESS: Status: RESOLVED | Noted: 2024-05-10 | Resolved: 2024-05-10

## 2024-05-10 PROBLEM — G45.9 TRANSIENT ISCHEMIC ATTACK (TIA): Status: ACTIVE | Noted: 2024-05-10

## 2024-05-10 PROBLEM — I74.9 TIA DUE TO EMBOLISM: Status: ACTIVE | Noted: 2024-05-10

## 2024-05-10 PROBLEM — G45.9 TIA DUE TO EMBOLISM: Status: ACTIVE | Noted: 2024-05-10

## 2024-05-10 PROBLEM — M48.9 CERVICAL SPINE DISEASE: Status: ACTIVE | Noted: 2024-05-10

## 2024-05-10 LAB
ALBUMIN SERPL-MCNC: 3.9 G/DL (ref 3.5–5.2)
ALBUMIN/GLOB SERPL: 1.6 G/DL
ALP SERPL-CCNC: 82 U/L (ref 39–117)
ALT SERPL W P-5'-P-CCNC: 33 U/L (ref 1–41)
ANION GAP SERPL CALCULATED.3IONS-SCNC: 10.2 MMOL/L (ref 5–15)
AST SERPL-CCNC: 25 U/L (ref 1–40)
BASOPHILS # BLD AUTO: 0.09 10*3/MM3 (ref 0–0.2)
BASOPHILS NFR BLD AUTO: 1.3 % (ref 0–1.5)
BH CV ECHO MEAS - ACS: 2.1 CM
BH CV ECHO MEAS - AO MAX PG: 8.4 MMHG
BH CV ECHO MEAS - AO MEAN PG: 4 MMHG
BH CV ECHO MEAS - AO ROOT DIAM: 3.6 CM
BH CV ECHO MEAS - AO V2 MAX: 145 CM/SEC
BH CV ECHO MEAS - AO V2 VTI: 27.3 CM
BH CV ECHO MEAS - AVA(I,D): 2.32 CM2
BH CV ECHO MEAS - EDV(CUBED): 147.2 ML
BH CV ECHO MEAS - EDV(MOD-SP2): 85.1 ML
BH CV ECHO MEAS - EDV(MOD-SP4): 129 ML
BH CV ECHO MEAS - EF(MOD-BP): 55.4 %
BH CV ECHO MEAS - EF(MOD-SP2): 52.2 %
BH CV ECHO MEAS - EF(MOD-SP4): 59.1 %
BH CV ECHO MEAS - ESV(CUBED): 33.7 ML
BH CV ECHO MEAS - ESV(MOD-SP2): 40.7 ML
BH CV ECHO MEAS - ESV(MOD-SP4): 52.8 ML
BH CV ECHO MEAS - FS: 38.8 %
BH CV ECHO MEAS - IVS/LVPW: 1.12 CM
BH CV ECHO MEAS - IVSD: 1.24 CM
BH CV ECHO MEAS - LA DIMENSION: 4.6 CM
BH CV ECHO MEAS - LAT PEAK E' VEL: 10.8 CM/SEC
BH CV ECHO MEAS - LV MASS(C)D: 247.7 GRAMS
BH CV ECHO MEAS - LV MAX PG: 3.6 MMHG
BH CV ECHO MEAS - LV MEAN PG: 2 MMHG
BH CV ECHO MEAS - LV V1 MAX: 95.3 CM/SEC
BH CV ECHO MEAS - LV V1 VTI: 20.2 CM
BH CV ECHO MEAS - LVIDD: 5.3 CM
BH CV ECHO MEAS - LVIDS: 3.2 CM
BH CV ECHO MEAS - LVOT AREA: 3.1 CM2
BH CV ECHO MEAS - LVOT DIAM: 2 CM
BH CV ECHO MEAS - LVPWD: 1.11 CM
BH CV ECHO MEAS - MED PEAK E' VEL: 6.9 CM/SEC
BH CV ECHO MEAS - MV A MAX VEL: 69 CM/SEC
BH CV ECHO MEAS - MV DEC TIME: 0.19 SEC
BH CV ECHO MEAS - MV E MAX VEL: 78.4 CM/SEC
BH CV ECHO MEAS - MV E/A: 1.14
BH CV ECHO MEAS - SV(LVOT): 63.5 ML
BH CV ECHO MEAS - SV(MOD-SP2): 44.4 ML
BH CV ECHO MEAS - SV(MOD-SP4): 76.2 ML
BH CV ECHO MEAS - TAPSE (>1.6): 2.49 CM
BH CV ECHO MEASUREMENTS AVERAGE E/E' RATIO: 8.86
BILIRUB SERPL-MCNC: 0.4 MG/DL (ref 0–1.2)
BUN SERPL-MCNC: 14 MG/DL (ref 6–20)
BUN/CREAT SERPL: 17.9 (ref 7–25)
CALCIUM SPEC-SCNC: 8.8 MG/DL (ref 8.6–10.5)
CHLORIDE SERPL-SCNC: 104 MMOL/L (ref 98–107)
CHOLEST SERPL-MCNC: 117 MG/DL (ref 0–200)
CO2 SERPL-SCNC: 22.8 MMOL/L (ref 22–29)
CREAT SERPL-MCNC: 0.78 MG/DL (ref 0.76–1.27)
DEPRECATED RDW RBC AUTO: 37.5 FL (ref 37–54)
EGFRCR SERPLBLD CKD-EPI 2021: 104 ML/MIN/1.73
EOSINOPHIL # BLD AUTO: 0.18 10*3/MM3 (ref 0–0.4)
EOSINOPHIL NFR BLD AUTO: 2.6 % (ref 0.3–6.2)
ERYTHROCYTE [DISTWIDTH] IN BLOOD BY AUTOMATED COUNT: 12.1 % (ref 12.3–15.4)
GLOBULIN UR ELPH-MCNC: 2.5 GM/DL
GLUCOSE BLDC GLUCOMTR-MCNC: 142 MG/DL (ref 70–99)
GLUCOSE BLDC GLUCOMTR-MCNC: 155 MG/DL (ref 70–99)
GLUCOSE BLDC GLUCOMTR-MCNC: 159 MG/DL (ref 70–99)
GLUCOSE SERPL-MCNC: 156 MG/DL (ref 65–99)
HBA1C MFR BLD: 7.4 % (ref 4.8–5.6)
HCT VFR BLD AUTO: 38.6 % (ref 37.5–51)
HDLC SERPL-MCNC: 30 MG/DL (ref 40–60)
HGB BLD-MCNC: 13.3 G/DL (ref 13–17.7)
IMM GRANULOCYTES # BLD AUTO: 0.03 10*3/MM3 (ref 0–0.05)
IMM GRANULOCYTES NFR BLD AUTO: 0.4 % (ref 0–0.5)
LDLC SERPL CALC-MCNC: 59 MG/DL (ref 0–100)
LDLC/HDLC SERPL: 1.82 {RATIO}
LEFT ATRIUM VOLUME INDEX: 22.8 ML/M2
LYMPHOCYTES # BLD AUTO: 2.27 10*3/MM3 (ref 0.7–3.1)
LYMPHOCYTES NFR BLD AUTO: 32.9 % (ref 19.6–45.3)
MAGNESIUM SERPL-MCNC: 1.8 MG/DL (ref 1.6–2.6)
MCH RBC QN AUTO: 29.4 PG (ref 26.6–33)
MCHC RBC AUTO-ENTMCNC: 34.5 G/DL (ref 31.5–35.7)
MCV RBC AUTO: 85.4 FL (ref 79–97)
MONOCYTES # BLD AUTO: 0.54 10*3/MM3 (ref 0.1–0.9)
MONOCYTES NFR BLD AUTO: 7.8 % (ref 5–12)
NEUTROPHILS NFR BLD AUTO: 3.78 10*3/MM3 (ref 1.7–7)
NEUTROPHILS NFR BLD AUTO: 55 % (ref 42.7–76)
NRBC BLD AUTO-RTO: 0 /100 WBC (ref 0–0.2)
PHOSPHATE SERPL-MCNC: 3.2 MG/DL (ref 2.5–4.5)
PLATELET # BLD AUTO: 307 10*3/MM3 (ref 140–450)
PMV BLD AUTO: 9.8 FL (ref 6–12)
POTASSIUM SERPL-SCNC: 3.5 MMOL/L (ref 3.5–5.2)
PROT SERPL-MCNC: 6.4 G/DL (ref 6–8.5)
RBC # BLD AUTO: 4.52 10*6/MM3 (ref 4.14–5.8)
SODIUM SERPL-SCNC: 137 MMOL/L (ref 136–145)
TRIGL SERPL-MCNC: 162 MG/DL (ref 0–150)
VLDLC SERPL-MCNC: 28 MG/DL (ref 5–40)
WBC NRBC COR # BLD AUTO: 6.89 10*3/MM3 (ref 3.4–10.8)

## 2024-05-10 PROCEDURE — G0378 HOSPITAL OBSERVATION PER HR: HCPCS

## 2024-05-10 PROCEDURE — 97165 OT EVAL LOW COMPLEX 30 MIN: CPT

## 2024-05-10 PROCEDURE — 97161 PT EVAL LOW COMPLEX 20 MIN: CPT

## 2024-05-10 PROCEDURE — 84100 ASSAY OF PHOSPHORUS: CPT | Performed by: STUDENT IN AN ORGANIZED HEALTH CARE EDUCATION/TRAINING PROGRAM

## 2024-05-10 PROCEDURE — 72141 MRI NECK SPINE W/O DYE: CPT

## 2024-05-10 PROCEDURE — 93306 TTE W/DOPPLER COMPLETE: CPT | Performed by: INTERNAL MEDICINE

## 2024-05-10 PROCEDURE — 92610 EVALUATE SWALLOWING FUNCTION: CPT

## 2024-05-10 PROCEDURE — 83735 ASSAY OF MAGNESIUM: CPT | Performed by: STUDENT IN AN ORGANIZED HEALTH CARE EDUCATION/TRAINING PROGRAM

## 2024-05-10 PROCEDURE — 93306 TTE W/DOPPLER COMPLETE: CPT

## 2024-05-10 PROCEDURE — 70551 MRI BRAIN STEM W/O DYE: CPT

## 2024-05-10 PROCEDURE — 80061 LIPID PANEL: CPT | Performed by: STUDENT IN AN ORGANIZED HEALTH CARE EDUCATION/TRAINING PROGRAM

## 2024-05-10 PROCEDURE — 63710000001 INSULIN LISPRO (HUMAN) PER 5 UNITS: Performed by: STUDENT IN AN ORGANIZED HEALTH CARE EDUCATION/TRAINING PROGRAM

## 2024-05-10 PROCEDURE — 80053 COMPREHEN METABOLIC PANEL: CPT | Performed by: STUDENT IN AN ORGANIZED HEALTH CARE EDUCATION/TRAINING PROGRAM

## 2024-05-10 PROCEDURE — 85025 COMPLETE CBC W/AUTO DIFF WBC: CPT | Performed by: STUDENT IN AN ORGANIZED HEALTH CARE EDUCATION/TRAINING PROGRAM

## 2024-05-10 PROCEDURE — 82948 REAGENT STRIP/BLOOD GLUCOSE: CPT | Performed by: STUDENT IN AN ORGANIZED HEALTH CARE EDUCATION/TRAINING PROGRAM

## 2024-05-10 PROCEDURE — 82948 REAGENT STRIP/BLOOD GLUCOSE: CPT

## 2024-05-10 PROCEDURE — 96372 THER/PROPH/DIAG INJ SC/IM: CPT

## 2024-05-10 PROCEDURE — 99223 1ST HOSP IP/OBS HIGH 75: CPT | Performed by: STUDENT IN AN ORGANIZED HEALTH CARE EDUCATION/TRAINING PROGRAM

## 2024-05-10 PROCEDURE — 83036 HEMOGLOBIN GLYCOSYLATED A1C: CPT | Performed by: STUDENT IN AN ORGANIZED HEALTH CARE EDUCATION/TRAINING PROGRAM

## 2024-05-10 PROCEDURE — 99239 HOSP IP/OBS DSCHRG MGMT >30: CPT | Performed by: FAMILY MEDICINE

## 2024-05-10 PROCEDURE — 99203 OFFICE O/P NEW LOW 30 MIN: CPT | Performed by: PSYCHIATRY & NEUROLOGY

## 2024-05-10 PROCEDURE — 25010000002 HEPARIN (PORCINE) PER 1000 UNITS: Performed by: STUDENT IN AN ORGANIZED HEALTH CARE EDUCATION/TRAINING PROGRAM

## 2024-05-10 RX ORDER — IBUPROFEN 600 MG/1
1 TABLET ORAL
Status: DISCONTINUED | OUTPATIENT
Start: 2024-05-10 | End: 2024-05-10 | Stop reason: HOSPADM

## 2024-05-10 RX ORDER — NICOTINE POLACRILEX 4 MG
15 LOZENGE BUCCAL
Status: DISCONTINUED | OUTPATIENT
Start: 2024-05-10 | End: 2024-05-10 | Stop reason: HOSPADM

## 2024-05-10 RX ORDER — ASPIRIN 81 MG/1
81 TABLET, CHEWABLE ORAL DAILY
Status: DISCONTINUED | OUTPATIENT
Start: 2024-05-10 | End: 2024-05-10 | Stop reason: HOSPADM

## 2024-05-10 RX ORDER — CLOPIDOGREL BISULFATE 75 MG/1
75 TABLET ORAL DAILY
Status: DISCONTINUED | OUTPATIENT
Start: 2024-05-10 | End: 2024-05-10 | Stop reason: HOSPADM

## 2024-05-10 RX ORDER — CLOPIDOGREL BISULFATE 75 MG/1
75 TABLET ORAL DAILY
Qty: 21 TABLET | Refills: 0 | Status: SHIPPED | OUTPATIENT
Start: 2024-05-11 | End: 2024-06-01

## 2024-05-10 RX ORDER — HEPARIN SODIUM 5000 [USP'U]/ML
5000 INJECTION, SOLUTION INTRAVENOUS; SUBCUTANEOUS EVERY 8 HOURS SCHEDULED
Status: DISCONTINUED | OUTPATIENT
Start: 2024-05-10 | End: 2024-05-10 | Stop reason: HOSPADM

## 2024-05-10 RX ORDER — SODIUM CHLORIDE 0.9 % (FLUSH) 0.9 %
10 SYRINGE (ML) INJECTION EVERY 12 HOURS SCHEDULED
Status: DISCONTINUED | OUTPATIENT
Start: 2024-05-10 | End: 2024-05-10 | Stop reason: HOSPADM

## 2024-05-10 RX ORDER — SODIUM CHLORIDE 9 MG/ML
40 INJECTION, SOLUTION INTRAVENOUS AS NEEDED
Status: DISCONTINUED | OUTPATIENT
Start: 2024-05-10 | End: 2024-05-10 | Stop reason: HOSPADM

## 2024-05-10 RX ORDER — LOSARTAN POTASSIUM 25 MG/1
25 TABLET ORAL DAILY
Status: DISCONTINUED | OUTPATIENT
Start: 2024-05-10 | End: 2024-05-10 | Stop reason: HOSPADM

## 2024-05-10 RX ORDER — ASPIRIN 325 MG
325 TABLET ORAL ONCE
Status: COMPLETED | OUTPATIENT
Start: 2024-05-10 | End: 2024-05-10

## 2024-05-10 RX ORDER — CHLORAL HYDRATE 500 MG
1000 CAPSULE ORAL DAILY
COMMUNITY

## 2024-05-10 RX ORDER — SODIUM CHLORIDE 0.9 % (FLUSH) 0.9 %
10 SYRINGE (ML) INJECTION AS NEEDED
Status: DISCONTINUED | OUTPATIENT
Start: 2024-05-10 | End: 2024-05-10 | Stop reason: HOSPADM

## 2024-05-10 RX ORDER — ASPIRIN 81 MG/1
81 TABLET, CHEWABLE ORAL DAILY
Qty: 30 TABLET | Refills: 0 | Status: SHIPPED | OUTPATIENT
Start: 2024-05-11 | End: 2024-06-10

## 2024-05-10 RX ORDER — DEXTROSE MONOHYDRATE 25 G/50ML
25 INJECTION, SOLUTION INTRAVENOUS
Status: DISCONTINUED | OUTPATIENT
Start: 2024-05-10 | End: 2024-05-10 | Stop reason: HOSPADM

## 2024-05-10 RX ORDER — ATORVASTATIN CALCIUM 40 MG/1
80 TABLET, FILM COATED ORAL NIGHTLY
Status: DISCONTINUED | OUTPATIENT
Start: 2024-05-10 | End: 2024-05-10 | Stop reason: HOSPADM

## 2024-05-10 RX ORDER — ASPIRIN 300 MG/1
300 SUPPOSITORY RECTAL DAILY
Status: DISCONTINUED | OUTPATIENT
Start: 2024-05-10 | End: 2024-05-10 | Stop reason: HOSPADM

## 2024-05-10 RX ORDER — INSULIN LISPRO 100 [IU]/ML
2-7 INJECTION, SOLUTION INTRAVENOUS; SUBCUTANEOUS
Status: DISCONTINUED | OUTPATIENT
Start: 2024-05-10 | End: 2024-05-10 | Stop reason: HOSPADM

## 2024-05-10 RX ADMIN — ASPIRIN 325 MG: 325 TABLET ORAL at 01:51

## 2024-05-10 RX ADMIN — CLOPIDOGREL BISULFATE 75 MG: 75 TABLET ORAL at 09:18

## 2024-05-10 RX ADMIN — HEPARIN SODIUM 5000 UNITS: 5000 INJECTION INTRAVENOUS; SUBCUTANEOUS at 13:31

## 2024-05-10 RX ADMIN — ASPIRIN 81 MG: 81 TABLET, CHEWABLE ORAL at 09:18

## 2024-05-10 RX ADMIN — Medication 10 ML: at 09:18

## 2024-05-10 RX ADMIN — LOSARTAN POTASSIUM 25 MG: 25 TABLET, FILM COATED ORAL at 11:44

## 2024-05-10 RX ADMIN — HEPARIN SODIUM 5000 UNITS: 5000 INJECTION INTRAVENOUS; SUBCUTANEOUS at 05:52

## 2024-05-10 RX ADMIN — INSULIN LISPRO 2 UNITS: 100 INJECTION, SOLUTION INTRAVENOUS; SUBCUTANEOUS at 09:18

## 2024-05-10 NOTE — PLAN OF CARE
Goal Outcome Evaluation:               FUNCTIONAL ASSESSMENT INSTRUMENT: Patient currently scored a level 7 of 7 on Functional Communication Measures for swallowing indicating a 0% limitation in function.    ASSESSMENT/ PLAN OF CARE:  Pt presents with functional oropharyngeal swallow.  No clinical signs or symptoms of aspiration noted.  Vocal quality remained clear to auscultation.    REHAB POTENTIAL:  Pt has good rehab potential.  The following limitations may influence improvement/ length of tx medical status.    RECOMMENDATIONS:   1.   DIET: Regular diet-thin liquids    2.  POSITION: 90 degrees upright    3.  COMPENSATORY STRATEGIES: General swallow precautions    No further dysphagia therapy is indicated at this time.  Available for reconsult should medical status warrant    Pt/responsible party agrees with plan of care and has been informed of all alternatives, risks and benefits.

## 2024-05-10 NOTE — PLAN OF CARE
Goal Outcome Evaluation:                                       Pt discharging home accompanied by wife. IV and telemetry dc'ed.

## 2024-05-10 NOTE — THERAPY EVALUATION
Acute Care - Speech Language Pathology   Swallow Initial Evaluation  [facility]     Patient Name: Aly Elise  : 1965  MRN: [id number]  Today's Date: 5/10/2024               Admit Date: 2024    Visit Dx:     ICD-10-CM ICD-9-CM   1. TIA (transient ischemic attack)  G45.9 435.9     Patient Active Problem List   Diagnosis    Class 1 obesity due to excess calories with serious comorbidity and body mass index (BMI) of 34.0 to 34.9 in adult    Essential hypertension    High blood pressure    High cholesterol    Hyperlipidemia    Hydronephrosis with renal calculous obstruction    Kidney stones    Rectal bleeding    Type 2 diabetes mellitus without complication, without long-term current use of insulin    Vitamin D deficiency    Hx of adenomatous colonic polyps    Colon cancer screening    Left sided numbness     Past Medical History:   Diagnosis Date    Diabetes mellitus     High blood pressure     High cholesterol     Hyperlipidemia     Hypertension     Kidney stones     Rectal bleeding     Sleep apnea     uses CPAP    Vitamin D deficiency      Past Surgical History:   Procedure Laterality Date    COLONOSCOPY N/A 2021    Procedure: COLONOSCOPY WITH POLYPECTOMY/SNARE;  Surgeon: Jersey Greene MD;  Location: [facility] ENDOSCOPY;  Service: General;  Laterality: N/A;  COLON POLYPS    KIDNEY STONE SURGERY      TONSILLECTOMY         Inpatient Speech Pathology Dysphagia Evaluation        PAIN SCALE: None noted    PRECAUTIONS/CONTRAINDICATIONS: None noted    SUSPECTED ABUSE/NEGLECT/EXPLOITATION: None noted    SOCIAL/PSYCHOLOGICAL NEEDS/BARRIERS: None noted    PAST SOCIAL HISTORY: Lives at home with family    PRIOR FUNCTION: Independent    PATIENT GOALS/EXPECTATIONS: Did not report     HISTORY: This patient is a 58-year-old male admitted with the above diagnosis.  Denies any prior history of dysphagia    CURRENT DIET LEVEL: Regular-thin liquids    OBJECTIVE:    TEST ADMINISTERED: Clinical dysphagia  evaluation    COGNITION/SAFETY AWARENESS: Alert and oriented    BEHAVIORAL OBSERVATIONS: Pleasant cooperative    ORAL MOTOR EXAM: Lingual and labial strength and range of motion within functional limits    VOICE QUALITY: Within normal limits    REFLEX EXAM: Deferred    POSTURE: 90 degrees upright    FEEDING/SWALLOWING FUNCTION: Assessed with full range of consistencies with thin liquids from spoon cup and straw, purée consistency soft and hard solids    CLINICAL OBSERVATIONS: Oral stage is characterized by good bolus preparation and control.  Timely oral transit.  Pharyngeal phase is timely with good laryngeal elevation per palpation.  No clinical signs or symptoms of aspiration noted.  Vocal quality remained clear to auscultation    DYSPHAGIA CRITERIA: N/A    FUNCTIONAL ASSESSMENT INSTRUMENT: Patient currently scored a level 7 of 7 on Functional Communication Measures for swallowing indicating a 0% limitation in function.    ASSESSMENT/ PLAN OF CARE:  Pt presents with functional oropharyngeal swallow.  No clinical signs or symptoms of aspiration noted.  Vocal quality remained clear to auscultation.    REHAB POTENTIAL:  Pt has good rehab potential.  The following limitations may influence improvement/ length of tx medical status.    RECOMMENDATIONS:   1.   DIET: Regular diet-thin liquids    2.  POSITION: 90 degrees upright    3.  COMPENSATORY STRATEGIES: General swallow precautions    No further dysphagia therapy is indicated at this time.  Available for reconsult should medical status warrant    Pt/responsible party agrees with plan of care and has been informed of all alternatives, risks and benefits.      EDUCATION  The patient has been educated in the following areas:   Dysphagia (Swallowing Impairment).          Blanca Napier, SLP  5/10/2024

## 2024-05-10 NOTE — ED PROVIDER NOTES
Time: 10:00 PM EDT  Date of encounter:  5/9/2024  Independent Historian/Clinical History and Information was obtained by:   Patient  Chief Complaint: Dizziness and facial and left upper extremity numbness    History is limited by: N/A    History of Present Illness:  Patient is a 58 y.o. year old male who presents to the emergency department for evaluation of dizziness, facial numbness, left upper extremity numbness.  Patient notes this afternoon he began having lightheadedness.  He describes this as near syncope.  Worse with standing upright.  He states during that same time he would have left facial numbness left upper extremity numbness and left lower extremity numbness.  He does note that the facial and upper extremity numbness was disproportionate to the left lower extremity numbness.  He states it was intermittent but it lasted up to greater than an hour.  It has resolved at this time.  He does note a slight headache.  Patient denies weakness.  The patient denies any vertigo.  The patient denies any difficulties with coordination, speech or swallowing.  Patient's had no chest pain or chest pressure.  The patient's had no shortness of breath.  The patient's wife does note that he took a baby aspirin prior to arrival    HPI    Patient Care Team  Primary Care Provider: Inez Engel APRN    Past Medical History:     No Known Allergies  Past Medical History:   Diagnosis Date    Diabetes mellitus     High blood pressure     High cholesterol     Hyperlipidemia     Hypertension     Kidney stones     Rectal bleeding     Sleep apnea     uses CPAP    Vitamin D deficiency      Past Surgical History:   Procedure Laterality Date    COLONOSCOPY N/A 12/6/2021    Procedure: COLONOSCOPY WITH POLYPECTOMY/SNARE;  Surgeon: Jersey Greene MD;  Location: [facility];  Service: General;  Laterality: N/A;  COLON POLYPS    KIDNEY STONE SURGERY      TONSILLECTOMY       Family History   Problem Relation Age of Onset     Prostate cancer Father     Prostate cancer Paternal Grandfather     Malig Hyperthermia Neg Hx        Home Medications:  Prior to Admission medications    Medication Sig Start Date End Date Taking? Authorizing Provider   amLODIPine (NORVASC) 10 MG tablet Take 10 mg by mouth Daily. 7/14/21   Rosemary Pineda MD   atenolol (TENORMIN) 25 MG tablet atenolol 25 mg oral tablet take 0.5 tablet by oral route 2 times a day   Suspended    Rosemary Pineda MD   atorvastatin (LIPITOR) 40 MG tablet Take 40 mg by mouth Daily. 4/13/21   Rosemary Pineda MD   lisinopril (PRINIVIL,ZESTRIL) 10 MG tablet Take 10 mg by mouth Daily.    Rosemary Pineda MD   losartan (COZAAR) 25 MG tablet Take 25 mg by mouth Daily. 7/14/21   Rosemary Pineda MD   metFORMIN ER (GLUCOPHAGE-XR) 500 MG 24 hr tablet Take 500 mg by mouth Daily. 4/13/21   Rosemary Pineda MD   potassium citrate (UROCIT-K) 5 MEQ (540 MG) CR tablet TAKE 2 TABLETS (10 MEQ TOTAL) BY MOUTH TWO TIMES DAILY (GENERIC FOR UROCIT-K 540MG 5MEQ) 5/19/21   Rosemary Pineda MD        Social History:   Social History     Tobacco Use    Smoking status: Never    Smokeless tobacco: Never   Vaping Use    Vaping status: Never Used   Substance Use Topics    Alcohol use: Never    Drug use: Never         Review of Systems:  Review of Systems   Constitutional:  Negative for chills, diaphoresis and fever.   HENT:  Negative for congestion, postnasal drip, rhinorrhea and sore throat.    Eyes:  Negative for photophobia.   Respiratory:  Negative for cough, chest tightness and shortness of breath.    Cardiovascular:  Negative for chest pain, palpitations and leg swelling.   Gastrointestinal:  Negative for abdominal pain, diarrhea, nausea and vomiting.   Genitourinary:  Negative for difficulty urinating, dysuria, flank pain, frequency, hematuria and urgency.   Musculoskeletal:  Negative for neck pain and neck stiffness.   Skin:  Negative for pallor and rash.   Neurological:   "Positive for light-headedness, numbness and headaches. Negative for dizziness, syncope and weakness.   Hematological:  Negative for adenopathy. Does not bruise/bleed easily.   Psychiatric/Behavioral: Negative.          Physical Exam:  /91 (BP Location: Right arm, Patient Position: Lying)   Pulse 61   Temp 97.7 °F (36.5 °C) (Oral)   Resp 20   Ht 185.4 cm (73\")   Wt 121 kg (267 lb 13.7 oz)   SpO2 95%   BMI 35.34 kg/m²     Physical Exam  Vitals and nursing note reviewed.   Constitutional:       General: He is not in acute distress.     Appearance: Normal appearance. He is not ill-appearing, toxic-appearing or diaphoretic.   HENT:      Head: Normocephalic and atraumatic.      Mouth/Throat:      Mouth: Mucous membranes are moist.   Eyes:      General: No visual field deficit.     Pupils: Pupils are equal, round, and reactive to light.   Neck:      Vascular: No carotid bruit.   Cardiovascular:      Rate and Rhythm: Normal rate and regular rhythm.      Pulses: Normal pulses.           Carotid pulses are 2+ on the right side and 2+ on the left side.       Radial pulses are 2+ on the right side and 2+ on the left side.        Femoral pulses are 2+ on the right side and 2+ on the left side.       Popliteal pulses are 2+ on the right side and 2+ on the left side.        Dorsalis pedis pulses are 2+ on the right side and 2+ on the left side.        Posterior tibial pulses are 2+ on the right side and 2+ on the left side.      Heart sounds: Normal heart sounds. No murmur heard.  Pulmonary:      Effort: Pulmonary effort is normal. No accessory muscle usage, respiratory distress or retractions.      Breath sounds: Normal breath sounds. No wheezing, rhonchi or rales.   Abdominal:      General: Abdomen is flat. There is no distension.      Palpations: Abdomen is soft. There is no mass or pulsatile mass.      Tenderness: There is no abdominal tenderness. There is no right CVA tenderness, left CVA tenderness, guarding or " rebound.      Comments: No rigidity   Musculoskeletal:         General: No swelling, tenderness or deformity.      Cervical back: Neck supple. No tenderness.      Right lower leg: No edema.      Left lower leg: No edema.   Skin:     General: Skin is warm and dry.      Capillary Refill: Capillary refill takes less than 2 seconds.      Coloration: Skin is not jaundiced or pale.      Findings: No erythema.   Neurological:      General: No focal deficit present.      Mental Status: He is alert and oriented to person, place, and time. Mental status is at baseline.      Cranial Nerves: Cranial nerves 2-12 are intact. No cranial nerve deficit, dysarthria or facial asymmetry.      Sensory: Sensation is intact. No sensory deficit.      Motor: Motor function is intact. No weakness or pronator drift.      Coordination: Coordination is intact. Coordination normal. Finger-Nose-Finger Test normal.      Comments: At the time my initial evaluation the patient's NIH stroke scale is 0     Psychiatric:         Mood and Affect: Mood normal.         Behavior: Behavior normal.                  Procedures:  Procedures      Medical Decision Making:      Comorbidities that affect care:    Diabetes, high blood pressure, hyperlipidemia    External Notes reviewed:    None      The following orders were placed and all results were independently analyzed by me:  Orders Placed This Encounter   Procedures    XR Chest 1 View    CT Head Without Contrast    CT Angiogram Neck    CT Angiogram Head    MRI Brain Without Contrast    MRI Cervical Spine Without Contrast    Brock Draw    Comprehensive Metabolic Panel    Single High Sensitivity Troponin T    Magnesium    Urinalysis With Microscopic If Indicated (No Culture) - Urine, Clean Catch    CBC Auto Differential    Hemoglobin A1c    Lipid Panel    Phosphorus    Magnesium    Comprehensive Metabolic Panel    CBC Auto Differential    Ambulatory Referral to Neurosurgery    Ambulatory Referral to  Neurology    Undress & Gown    Vital Signs    Orthostatic Blood Pressure    Nursing Dysphagia Screening (Complete Prior to Giving Anything By Mouth)    Nurse to Call MD or Nutrition Services for Diet if Patient Passes Dysphagia Screen    Place Sequential Compression Device    Discharge Follow-up with PCP    Inpatient Diabetes Educator Consult    OT Consult: Eval & Treat    OT Plan of Care Cert / Re-Cert    PT Consult: Eval & Treat As Tolerated    POC Glucose Once    POC Glucose Once    ECG 12 Lead ED Triage Standing Order; Weak / Dizzy / AMS    Adult Transthoracic Echo Complete W/ Cont if Necessary Per Protocol    Initiate Observation Status    Discharge patient    CBC & Differential    Green Top (Gel)    Lavender Top    Gold Top - SST    Light Blue Top    CBC & Differential       Medications Given in the Emergency Department:  Medications   iopamidol (ISOVUE-370) 76 % injection 100 mL (90 mL Intravenous Given 5/9/24 2251)   aspirin tablet 325 mg (325 mg Oral Given 5/10/24 0151)        ED Course:    ED Course as of 05/11/24 0246   Thu May 09, 2024   2139 EKG:    Rhythm: Sinus rhythm  Rate: 65  Intervals: Normal VA and QT interval  T-wave: Isolated nonspecific T wave inversion III  ST Segment: No pathological ST elevation and reciprocal ST depression to suggest STEMI    EKG Comparison: No EKG available for comparison    Interpreted by me   [SD]      ED Course User Index  [SD] Yunior Moore DO       Labs:    Lab Results (last 24 hours)       Procedure Component Value Units Date/Time    Hemoglobin A1c [[id number]]  (Abnormal) Collected: 05/10/24 0412    Specimen: Blood from Arm, Right Updated: 05/10/24 1030     Hemoglobin A1C 7.40 %     Narrative:      Hemoglobin A1C Ranges:    Increased Risk for Diabetes  5.7% to 6.4%  Diabetes                     >= 6.5%  Diabetic Goal                < 7.0%    Lipid Panel [[id number]]  (Abnormal) Collected: 05/10/24 0412    Specimen: Blood from Arm, Right Updated: 05/10/24 0524      Total Cholesterol 117 mg/dL      Triglycerides 162 mg/dL      HDL Cholesterol 30 mg/dL      LDL Cholesterol  59 mg/dL      VLDL Cholesterol 28 mg/dL      LDL/HDL Ratio 1.82    Narrative:      Cholesterol Reference Ranges  (U.S. Department of Health and Human Services ATP III Classifications)    Desirable          <200 mg/dL  Borderline High    200-239 mg/dL  High Risk          >240 mg/dL      Triglyceride Reference Ranges  (U.S. Department of Health and Human Services ATP III Classifications)    Normal           <150 mg/dL  Borderline High  150-199 mg/dL  High             200-499 mg/dL  Very High        >500 mg/dL    HDL Reference Ranges  (U.S. Department of Health and Human Services ATP III Classifications)    Low     <40 mg/dl (major risk factor for CHD)  High    >60 mg/dl ('negative' risk factor for CHD)        LDL Reference Ranges  (U.S. Department of Health and Human Services ATP III Classifications)    Optimal          <100 mg/dL  Near Optimal     100-129 mg/dL  Borderline High  130-159 mg/dL  High             160-189 mg/dL  Very High        >189 mg/dL    Phosphorus [[id number]]  (Normal) Collected: 05/10/24 0412    Specimen: Blood from Arm, Right Updated: 05/10/24 0524     Phosphorus 3.2 mg/dL     Magnesium [[id number]]  (Normal) Collected: 05/10/24 0412    Specimen: Blood from Arm, Right Updated: 05/10/24 0524     Magnesium 1.8 mg/dL     Comprehensive Metabolic Panel [[id number]]  (Abnormal) Collected: 05/10/24 0412    Specimen: Blood from Arm, Right Updated: 05/10/24 0524     Glucose 156 mg/dL      BUN 14 mg/dL      Creatinine 0.78 mg/dL      Sodium 137 mmol/L      Potassium 3.5 mmol/L      Chloride 104 mmol/L      CO2 22.8 mmol/L      Calcium 8.8 mg/dL      Total Protein 6.4 g/dL      Albumin 3.9 g/dL      ALT (SGPT) 33 U/L      AST (SGOT) 25 U/L      Alkaline Phosphatase 82 U/L      Total Bilirubin 0.4 mg/dL      Globulin 2.5 gm/dL      A/G Ratio 1.6 g/dL      BUN/Creatinine Ratio 17.9     Anion Gap 10.2  mmol/L      eGFR 104.0 mL/min/1.73     Narrative:      GFR Normal >60  Chronic Kidney Disease <60  Kidney Failure <15      CBC & Differential [[id number]]  (Abnormal) Collected: 05/10/24 0412    Specimen: Blood from Arm, Right Updated: 05/10/24 0452    Narrative:      The following orders were created for panel order CBC & Differential.  Procedure                               Abnormality         Status                     ---------                               -----------         ------                     CBC Auto Differential[[id number]]        Abnormal            Final result                 Please view results for these tests on the individual orders.    CBC Auto Differential [[id number]]  (Abnormal) Collected: 05/10/24 0412    Specimen: Blood from Arm, Right Updated: 05/10/24 0452     WBC 6.89 10*3/mm3      RBC 4.52 10*6/mm3      Hemoglobin 13.3 g/dL      Hematocrit 38.6 %      MCV 85.4 fL      MCH 29.4 pg      MCHC 34.5 g/dL      RDW 12.1 %      RDW-SD 37.5 fl      MPV 9.8 fL      Platelets 307 10*3/mm3      Neutrophil % 55.0 %      Lymphocyte % 32.9 %      Monocyte % 7.8 %      Eosinophil % 2.6 %      Basophil % 1.3 %      Immature Grans % 0.4 %      Neutrophils, Absolute 3.78 10*3/mm3      Lymphocytes, Absolute 2.27 10*3/mm3      Monocytes, Absolute 0.54 10*3/mm3      Eosinophils, Absolute 0.18 10*3/mm3      Basophils, Absolute 0.09 10*3/mm3      Immature Grans, Absolute 0.03 10*3/mm3      nRBC 0.0 /100 WBC     POC Glucose Once [[id number]]  (Abnormal) Collected: 05/10/24 0553    Specimen: Blood Updated: 05/10/24 0556     Glucose 155 mg/dL      Comment: Serial Number: [id number]:  [id number]       POC Glucose 4x Daily Before Meals & at Bedtime [[id number]]  (Abnormal) Collected: 05/10/24 0818    Specimen: Blood Updated: 05/10/24 0820     Glucose 159 mg/dL      Comment: Serial Number: [id number]:  [id number]       POC Glucose 4x Daily Before Meals & at Bedtime [[id number]]  (Abnormal) Collected:  05/10/24 1145    Specimen: Blood Updated: 05/10/24 1146     Glucose 142 mg/dL      Comment: Serial Number: [id number]:  [id number]                Imaging:    Adult Transthoracic Echo Complete W/ Cont if Necessary Per Protocol    Result Date: 5/10/2024    Left ventricular systolic function is normal. Left ventricular ejection fraction appears to be 56 - 60%.   Left ventricular wall thickness is consistent with borderline concentric hypertrophy.   There is diastolic dysfunction of the left ventricle.   The left atrial cavity is mildly dilated.   There are no significant valvular abnormalities.     MRI Cervical Spine Without Contrast    Result Date: 5/10/2024   MRI CERVICAL SPINE WO CONTRAST-  Date of Exam: 5/10/2024 9:20 AM  Indication: left cervical radic, h/o injury- active paresthesias, r/o acute disc/compression; G45.9-Transient cerebral ischemic attack, unspecified.  Comparison: 5/9/2024 CT  Technique:  Routine multiplanar/multisequence sequence images of the cervical spine were obtained without contrast administration.    Findings: There is no evidence of fracture. No suspicious bony lesion. The craniocervical junction is intact. There is normal cervical spine alignment without significant listhesis.  The spinal cord is normal in signal and appearance. Included intracranial structures are unremarkable. Paraspinal soft tissues show no acute abnormality.  There is multilevel disc desiccation. Level-by-level details as follows:  C2-3: Mild posterior disc osteophyte complex. No significant canal or significant neural foraminal narrowing.  C3-4: Posterior disc osteophyte complex. No significant canal narrowing. Mild right neural foraminal narrowing.  C4-5: Posterior disc osteophyte complex. No significant canal narrowing. Mild to moderate left neural foraminal narrowing.  C5-6: Posterior disc osteophyte complex with facet arthropathy. No significant canal narrowing. Moderate to severe right and mild left neural  foraminal narrowing.  C6-7: Posterior disc osteophyte complex with mild facet arthropathy. No significant canal or neural foraminal narrowing.  C7-T1: Posterior disc osteophyte complex with no significant canal or significant neural foraminal narrowing.      Impression: No acute abnormality of the cervical spine.  Multilevel degenerative changes without significant canal narrowing. Varying multilevel neural foraminal narrowing, moderate to severe on the right at C5-6. Level-by-level details as above.    Electronically Signed By-Guru Shin MD On:5/10/2024 10:14 AM      MRI Brain Without Contrast    Result Date: 5/10/2024  EXAMINATION: MRI BRAIN WO CONTRAST-  DATE OF EXAM: 5/10/2024 7:00 AM  INDICATION: TIA, Left sided numbness and tingling; G45.9-Transient cerebral ischemic attack, unspecified.  COMPARISON: Head CT 5/9/2024  TECHNIQUE: Multiplanar multisequence images of the brain were performed without contrast according to routine brain MRI protocol.  FINDINGS: Negative for areas of diffusion restriction to suggest a recent infarct. Negative for acute hemorrhage, large mass lesion, midline shift or hydrocephalus. Parenchymal volume appears normal for age. Minimal periventricular T2/FLAIR hyperintense signal suggestive of chronic microvascular ischemic change. No large extra-axial collection. Midline structures within normal limits. Orbits symmetric. Major intracranial flow voids are preserved. No obstructive sinus disease. No large mastoid effusion. No suspicious areas of susceptibility artifact.       No acute MRI findings. Specifically no findings to suggest a recent infarct.     Electronically Signed By-Ho Rodriguez MD On:5/10/2024 8:33 AM         Differential Diagnosis and Discussion:    Stroke: Differential diagnosis in this patient with signs of possible ischemic stroke include TIA or ischemic stroke, hemorrhagic stroke, hypoglycemic episode, toxic or metabolic encephalopathy, paresthesias.    All labs  were reviewed and interpreted by me.  All X-rays impressions were independently interpreted by me.  EKG was interpreted by me.  CT scan radiology impression was interpreted by me.    MDM  Number of Diagnoses or Management Options  TIA (transient ischemic attack)  Diagnosis management comments: The patient's urinalysis was negative for obvious infection or blood    The patient's CMP was reviewed and shows no abnormalities of critical concern.  Of note, the patient's sodium and potassium are acceptable.  The patient's liver enzymes are unremarkable.  The patient's renal function including creatinine is preserved.  The patient has a normal anion gap.    The patient's CBC was reviewed and shows no abnormalities of critical concern.  Of note, there is no anemia requiring a blood transfusion and the platelet count is acceptable    The patient's EKG demonstrated a normal sinus rhythm.  The EKG demonstrated no evidence of dysrhythmia.  The patient had no acute ST abnormalities or acute T wave abnormalities to indicate STEMI or other acute cardiac pathology, injury or ischemia    CT scan of the brain demonstrated no acute abnormalities    CTA of the neck and head demonstrated no evidence of LVO        01:45 EDT  At the time of admission, the patient is resting comfortably no acute distress and nontoxic.  The patient has had recurrent symptoms while in the emergency room..  However at this time, the patient has a normal neurological exam and he denies any numbness.  The patient will be admitted to the hospital for further evaluation and workup of the TIA symptoms       Amount and/or Complexity of Data Reviewed  Clinical lab tests: reviewed  Tests in the radiology section of CPT®: reviewed  Tests in the medicine section of CPT®: reviewed  Discuss the patient with other providers: yes (01:45 EDT  I discussed the case with the hospitalist, Dr. Patterson.  We have discussed the patient's presenting symptoms, laboratory values,  imaging and condition at the time of admission.  They will evaluate the patient in the emergency room and admit the patient to the hospital)               Social Determinants of Health:    Patient is independent, reliable, and has access to care.       Disposition and Care Coordination:    Admit:   Through independent evaluation of the patient's history, physical, and imperical data, the patient meets criteria for inpatient admission to the hospital.        Final diagnoses:   TIA (transient ischemic attack)        ED Disposition       ED Disposition   Decision to Admit    Condition   --    Comment   Level of Care: Telemetry [5]   Diagnosis: Left sided numbness [[id number]]   Bed Request Comments: stroke eval                 This medical record created using voice recognition software.             Yunior Moore DO  05/11/24 0246

## 2024-05-10 NOTE — THERAPY EVALUATION
Patient Name: Aly Elise  : 1965    MRN: [id number]                              Today's Date: 5/10/2024       Admit Date: 2024    Visit Dx:     ICD-10-CM ICD-9-CM   1. TIA (transient ischemic attack)  G45.9 435.9     Patient Active Problem List   Diagnosis    Class 1 obesity due to excess calories with serious comorbidity and body mass index (BMI) of 34.0 to 34.9 in adult    Essential hypertension    High blood pressure    High cholesterol    Hyperlipidemia    Hydronephrosis with renal calculous obstruction    Kidney stones    Rectal bleeding    Type 2 diabetes mellitus without complication, without long-term current use of insulin    Vitamin D deficiency    Hx of adenomatous colonic polyps    Colon cancer screening    Left sided numbness     Past Medical History:   Diagnosis Date    Diabetes mellitus     High blood pressure     High cholesterol     Hyperlipidemia     Hypertension     Kidney stones     Rectal bleeding     Sleep apnea     uses CPAP    Vitamin D deficiency      Past Surgical History:   Procedure Laterality Date    COLONOSCOPY N/A 2021    Procedure: COLONOSCOPY WITH POLYPECTOMY/SNARE;  Surgeon: Jersey Greene MD;  Location: [facility] ENDOSCOPY;  Service: General;  Laterality: N/A;  COLON POLYPS    KIDNEY STONE SURGERY      TONSILLECTOMY        General Information       Row Name 05/10/24 0934          OT Time and Intention    Document Type evaluation  -PG     Mode of Treatment individual therapy;occupational therapy  -PG       Row Name 05/10/24 0934          General Information    Prior Level of Function independent:;transfer;ADL's  -PG     Existing Precautions/Restrictions no known precautions/restrictions  -PG     Barriers to Rehab none identified  -PG       Row Name 05/10/24 0934          Occupational Profile    Reason for Services/Referral (Occupational Profile) Patient is a pleasant 58-year-old male admitted for TIA.  Patient being evaluated by Occupational Therapy due to  recent decline in ADL function.  No previous OT services identified  -PG       Row Name 05/10/24 0934          Living Environment    People in Home spouse  -PG       Row Name 05/10/24 0934          Cognition    Orientation Status (Cognition) oriented x 4  -PG               User Key  (r) = Recorded By, (t) = Taken By, (c) = Cosigned By      Initials Name Provider Type    PG Javy Laboy, OT Occupational Therapist                     Mobility/ADL's       Row Name 05/10/24 0935          Transfers    Transfers sit-stand transfer;stand-sit transfer  -PG       Row Name 05/10/24 0935          Sit-Stand Transfer    Sit-Stand [place] (Transfers) independent  -PG       Row Name 05/10/24 0935          Stand-Sit Transfer    Stand-Sit [place] (Transfers) independent  -PG       Row Name 05/10/24 0935          Activities of Daily Living    BADL Assessment/Intervention bathing;upper body dressing;lower body dressing;grooming;toileting  -PG       Row Name 05/10/24 0935          Bathing Assessment/Intervention    [place] Level (Bathing) bathing skills;independent  -PG       Row Name 05/10/24 0935          Upper Body Dressing Assessment/Training    [place] Level (Upper Body Dressing) upper body dressing skills;independent  -PG       Row Name 05/10/24 0935          Lower Body Dressing Assessment/Training    [place] Level (Lower Body Dressing) lower body dressing skills;independent  -PG       Row Name 05/10/24 0935          Grooming Assessment/Training    [place] Level (Grooming) grooming skills;independent  -PG       Row Name 05/10/24 0935          Toileting Assessment/Training    [place] Level (Toileting) toileting skills;independent  -PG               User Key  (r) = Recorded By, (t) = Taken By, (c) = Cosigned By      Initials Name Provider Type    PG Javy Laboy, OT Occupational Therapist                   Obj/Interventions       Row Name 05/10/24 0935          Sensory Assessment (Somatosensory)     Sensory Assessment (Somatosensory) sensation intact  -PG       [facility] Name 05/10/24 0935          Vision Assessment/Intervention    Visual Impairment/Limitations WFL  -PG       Row Name 05/10/24 0935          Range of Motion Comprehensive    General Range of Motion no range of motion deficits identified  -PG       Row Name 05/10/24 0935          Strength Comprehensive (MMT)    General Manual Muscle Testing (MMT) Assessment no strength deficits identified  -PG       Row Name 05/10/24 0935          Motor Skills    Motor Skills coordination;functional endurance  -PG     Coordination WFL  -PG     Functional Endurance Good  -PG               User Key  (r) = Recorded By, (t) = Taken By, (c) = Cosigned By      Initials Name Provider Type    PG Javy Laboy, OT Occupational Therapist                   Goals/Plan    No documentation.                  Clinical Impression       Row Name 05/10/24 0936          Pain Assessment    Pretreatment Pain Rating 0/10 - no pain  -PG     Posttreatment Pain Rating 0/10 - no pain  -PG       Row Name 05/10/24 0936          Plan of Care Review    Plan of Care Reviewed With patient  -PG     Progress improving  -PG     Outcome Evaluation OT evaluation completed with no additional OT services recommended.  Patient states his symptoms have resolved with no further numbness or tingling on his left side.  Patient is independent with all self-care and functional transfers  -PG       [facility] Name 05/10/24 0936          Therapy Assessment/Plan (OT)    Criteria for Skilled Therapeutic Interventions Met (OT) no;no problems identified which require skilled intervention  -PG     Therapy Frequency (OT) evaluation only  -PG       Row Name 05/10/24 0936          Therapy Plan Review/Discharge Plan (OT)    Anticipated Discharge Disposition (OT) home  -PG               User Key  (r) = Recorded By, (t) = Taken By, (c) = Cosigned By      Initials Name Provider Type    PG Javy Laboy, OT Occupational Therapist                    Outcome Measures       Row Name 05/10/24 0937          How much help from another is currently needed...    Putting on and taking off regular lower body clothing? 4  -PG     Bathing (including washing, rinsing, and drying) 4  -PG     Toileting (which includes using toilet bed pan or urinal) 4  -PG     Putting on and taking off regular upper body clothing 4  -PG     Taking care of personal grooming (such as brushing teeth) 4  -PG     Eating meals 4  -PG     AM-PAC 6 Clicks Score (OT) 24  -PG       Row Name 05/10/24 0750 05/10/24 0330       How much help from another person do you currently need...    Turning from your back to your side while in flat bed without using bedrails? 4  -AR 4  -EH    Moving from lying on back to sitting on the side of a flat bed without bedrails? 4  -AR 4  -EH    Moving to and from a bed to a chair (including a wheelchair)? 4  -AR 4  -EH    Standing up from a chair using your arms (e.g., wheelchair, bedside chair)? 4  -AR 4  -EH    Climbing 3-5 steps with a railing? 4  -AR 4  -EH    To walk in hospital room? 4  -AR 4  -EH    AM-PAC 6 Clicks Score (PT) 24  -AR 24  -EH    Highest Level of Mobility Goal 8 --> Walked 250 feet or more  -AR 8 --> Walked 250 feet or more  -EH      Row Name 05/10/24 0937          Functional Assessment    Outcome Measure Options AM-PAC 6 Clicks Daily Activity (OT);Optimal Instrument  -PG       Row Name 05/10/24 0937          Optimal Instrument    Optimal Instrument Optimal - 3  -PG     Bending/Stooping 1  -PG     Standing 1  -PG     Reaching 1  -PG     From the list, choose the 3 activities you would most like to be able to do without any difficulty Bending/stooping;Standing;Reaching  -PG     Total Score Optimal - 3 3  -PG               User Key  (r) = Recorded By, (t) = Taken By, (c) = Cosigned By      Initials Name Provider Type    Janice Jennings, RN Registered Nurse    Javy Erazo, OT Occupational Therapist    Inez De Paz RN Registered  Nurse                      OT Recommendation and Plan  Therapy Frequency (OT): evaluation only  Plan of Care Review  Plan of Care Reviewed With: patient  Progress: improving  Outcome Evaluation: OT evaluation completed with no additional OT services recommended.  Patient states his symptoms have resolved with no further numbness or tingling on his left side.  Patient is independent with all self-care and functional transfers     Time Calculation:   Evaluation Complexity (OT)  Review Occupational Profile/Medical/Therapy History Complexity: brief/low complexity  Assessment, Occupational Performance/Identification of Deficit Complexity: 1-3 performance deficits  Clinical Decision Making Complexity (OT): problem focused assessment/low complexity  Overall Complexity of Evaluation (OT): low complexity     Time Calculation- OT       Row Name 05/10/24 0938             Time Calculation- OT    OT Received On 05/10/24  -PG         Untimed Charges    OT Eval/Re-eval Minutes 30  -PG         Total Minutes    Untimed Charges Total Minutes 30  -PG       Total Minutes 30  -PG                User Key  (r) = Recorded By, (t) = Taken By, (c) = Cosigned By      Initials Name Provider Type    PG Javy Laboy OT Occupational Therapist                  Therapy Charges for Today       Code Description Service Date Service Provider Modifiers Qty    94091766301 HC OT EVAL LOW COMPLEXITY 2 5/10/2024 Javy Labyo OT GO 1                 Javy Laboy OT  5/10/2024

## 2024-05-10 NOTE — THERAPY EVALUATION
Acute Care - Physical Therapy Initial Evaluation   [facility]     Patient Name: Aly Elise  : 1965  MRN: [id number]  Today's Date: 5/10/2024     Admit date: 2024     Referring Physician: Nando Coles MD     Surgery Date:* No surgery found *             Visit Dx:     ICD-10-CM ICD-9-CM   1. TIA (transient ischemic attack)  G45.9 435.9   2. Difficulty in walking  R26.2 719.7     Patient Active Problem List   Diagnosis    Class 1 obesity due to excess calories with serious comorbidity and body mass index (BMI) of 34.0 to 34.9 in adult    Essential hypertension    High blood pressure    High cholesterol    Hyperlipidemia    Hydronephrosis with renal calculous obstruction    Kidney stones    Rectal bleeding    Type 2 diabetes mellitus without complication, without long-term current use of insulin    Vitamin D deficiency    Hx of adenomatous colonic polyps    Colon cancer screening    Left sided numbness     Past Medical History:   Diagnosis Date    Diabetes mellitus     High blood pressure     High cholesterol     Hyperlipidemia     Hypertension     Kidney stones     Rectal bleeding     Sleep apnea     uses CPAP    Vitamin D deficiency      Past Surgical History:   Procedure Laterality Date    COLONOSCOPY N/A 2021    Procedure: COLONOSCOPY WITH POLYPECTOMY/SNARE;  Surgeon: Jersey Greene MD;  Location: [facility] ENDOSCOPY;  Service: General;  Laterality: N/A;  COLON POLYPS    KIDNEY STONE SURGERY      TONSILLECTOMY       PT Assessment (Last 12 Hours)       PT Evaluation and Treatment       Row Name 05/10/24 1106          Physical Therapy Time and Intention    Subjective Information no complaints  -FARHEEN     Document Type evaluation  -FARHEEN     Mode of Treatment individual therapy;physical therapy  -FARHEEN     Patient Effort good  -FARHEEN       Row Name 05/10/24 1106          General Information    Patient Observations alert;cooperative;agree to therapy  -FARHEEN     Prior Level of Function independent:;all  household mobility;community mobility  -FAHREEN     Existing Precautions/Restrictions no known precautions/restrictions  -FARHEEN     Barriers to Rehab none identified  -FARHEEN       Row Name 05/10/24 1106          Cognition    Orientation Status (Cognition) oriented x 3  -FARHEEN       [facility] Name 05/10/24 1106          Range of Motion (ROM)    Range of Motion ROM is WFL  -FARHEEN       [facility] Name 05/10/24 1106          Strength (Manual Muscle Testing)    Strength (Manual Muscle Testing) strength is [facility] Name 05/10/24 1106          Bed Mobility    Bed Mobility bed mobility (all) activities;supine-sit  -FARHEEN     All Activities, [place] (Bed Mobility) independent  -FARHEEN     Supine-Sit [place] (Bed Mobility) independent  -FARHEEN       [facility] Name 05/10/24 1106          Transfers    Transfers sit-stand transfer  -FARHEEN       [facility] Name 05/10/24 1106          Sit-Stand Transfer    Sit-Stand [place] (Transfers) independent  -FARHEEN       [facility] Name 05/10/24 1106          Gait/Stairs (Locomotion)    Gait/Stairs Locomotion gait/ambulation independence  -FARHEEN     [place] Level (Gait) independent  -FARHEEN     Distance in Feet (Gait) 100  -FARHEEN       [facility] Name 05/10/24 1106          Balance    Balance Assessment standing dynamic balance  -FARHEEN     Dynamic Standing Balance independent  -FARHEEN       [facility] Name 05/10/24 1106          Plan of Care Review    Plan of Care Reviewed With patient  -FARHEEN     Outcome Evaluation Pt did not demonstrate any safety or mobility deficits during the initial evaluation.  Pt is safe to continue ambulating within their room independently until their discharge from the hospital.  Pt will be discharged from PT services at this time.  -FARHEEN       [facility] Name 05/10/24 1106          Therapy Assessment/Plan (PT)    Criteria for Skilled Interventions Met (PT) no problems identified which require skilled intervention  -FARHEEN     Therapy Frequency (PT) evaluation only  -FARHEEN       Row Name 05/10/24 1106          PT Evaluation Complexity    History,  PT Evaluation Complexity no personal factors and/or comorbidities  -FARHEEN     Examination of Body Systems (PT Eval Complexity) total of 4 or more elements  -FARHEEN     Clinical Presentation (PT Evaluation Complexity) stable  -FARHEEN     Clinical Decision Making (PT Evaluation Complexity) low complexity  -FARHEEN     Overall Complexity (PT Evaluation Complexity) low complexity  -FARHEEN       Row Name 05/10/24 1106          Therapy Plan Review/Discharge Plan (PT)    Therapy Plan Review (PT) evaluation/treatment results reviewed;participants voiced agreement with care plan;participants included;patient  -FARHEEN       Row Name 05/10/24 1106          Physical Therapy Goals    Problem Specific Goal Selection (PT) problem specific goal 1, PT  -FARHEEN       Row Name 05/10/24 1106          Problem Specific Goal 1 (PT)    Problem Specific Goal 1 (PT) Complete PT evaluation  -FARHEEN     Time Frame (Problem Specific Goal 1, PT) 1 day  -FARHEEN     Progress/Outcome (Problem Specific Goal 1, PT) goal met  -FARHEEN               User Key  (r) = Recorded By, (t) = Taken By, (c) = Cosigned By      Initials Name Provider Type    Matheus Young, PT Physical Therapist                      PT Recommendation and Plan  Anticipated Discharge Disposition (PT): home  Therapy Frequency (PT): evaluation only  Plan of Care Reviewed With: patient  Outcome Evaluation: Pt did not demonstrate any safety or mobility deficits during the initial evaluation.  Pt is safe to continue ambulating within their room independently until their discharge from the hospital.  Pt will be discharged from PT services at this time.   Outcome Measures       Row Name 05/10/24 1100             How much help from another person do you currently need...    Turning from your back to your side while in flat bed without using bedrails? 4  -FARHEEN      Moving from lying on back to sitting on the side of a flat bed without bedrails? 4  -FARHEEN      Moving to and from a bed to a chair (including a wheelchair)? 4  -FARHEEN       Standing up from a chair using your arms (e.g., wheelchair, bedside chair)? 4  -FARHEEN      Climbing 3-5 steps with a railing? 4  -FARHEEN      To walk in hospital room? 4  -FARHEEN      AM-PAC 6 Clicks Score (PT) 24  -FARHEEN      Highest Level of Mobility Goal 8 --> Walked 250 feet or more  -FARHEEN         Functional Assessment    Outcome Measure Options AM-PAC 6 Clicks Basic Mobility (PT)  -FARHEEN                User Key  (r) = Recorded By, (t) = Taken By, (c) = Cosigned By      Initials Name Provider Type    Matheus Young, PT Physical Therapist                     Time Calculation:    PT Charges       Row Name 05/10/24 1130             Time Calculation    PT Received On 05/10/24  -FARHEEN         Untimed Charges    PT Eval/Re-eval Minutes 20  -FARHEEN         Total Minutes    Untimed Charges Total Minutes 20  -FARHEEN       Total Minutes 20  -FARHEEN                User Key  (r) = Recorded By, (t) = Taken By, (c) = Cosigned By      Initials Name Provider Type    Matheus Young PT Physical Therapist                      PT G-Codes  Outcome Measure Options: AM-PAC 6 Clicks Basic Mobility (PT)  AM-PAC 6 Clicks Score (PT): 24  AM-PAC 6 Clicks Score (OT): 24    Matheus Ventura PT  5/10/2024

## 2024-05-10 NOTE — DISCHARGE SUMMARY
[facility]         HOSPITALIST  DISCHARGE SUMMARY    Patient Name: Aly Elise  : 1965  MRN: [id number]    Date of Admission: 2024  Date of Discharge:  5/10/2024    Primary Care Physician: Inez Engel APRN    Consults       Date and Time Order Name Status Description    5/10/2024  3:35 AM Inpatient Neurology Consult Stroke      5/10/2024  1:45 AM Inpatient Hospitalist Consult              Active and Resolved Hospital Problems:  Active Hospital Problems    Diagnosis POA    Transient ischemic attack (TIA) [G45.9] Yes    Cervical spine disease [M48.9] Yes      Resolved Hospital Problems    Diagnosis POA    **Left sided numbness [R20.0] Yes   Assessment:  Intermittent episodes of of left facial numbness, left upper and left lower extremity numbness, and tingling-currently resolved  Cervical spine disease  TIA  Hypertension  Hyperlipidemia  Type 2 diabetes mellitus  Sleep apnea    Plan:  Labs and imaging reviewed  Discharge home  Aspirin and Plavix for 21 days then drop Plavix and continue aspirin  Neurology and neurosurgery follow-up as outpatient  Advised patient did not take Celebrex while on Plavix in the short-term, to resume as outpatient    Hospital Course     Hospital Course:    Chief complaint: Left-sided numbness and tingling    Summary:  58 y.o. male with a past medical history of hypertension, hyperlipidemia, diabetes mellitus, sleep apnea presented to the ED for evaluation of dizziness, left- facial numbness, left upper and lower extremity numbness and tingling sensation that started around 1 PM.  Patient had intermittent episodes of these and resolving.  Had multiple episode since 1 PM yesterday.  The longest has been for 1 hour and has self resolved.  Patient took 2 baby aspirin's in the home.  Patient had another similar episode while the patient was in the CT and has improved.  Numbness and tingling is more prominent in the left chest and left abdomen and left arm  compared to the rest of the left side of the body.  Denied any weakness, nausea, vomiting, vision changes, headache, palpitations.  No similar episodes in the past.  Hospitalized for further monitoring and management, treated as a TIA, MRI negative for stroke, CT imaging with no occlusions, A1c decent control, 7.4, LDL 59, at goal, MRI C-spine picked up on right sided moderate to severe multilevel neural foraminal narrowing at the level of C5 and 6, not consistent with symptoms.  Patient wishes to follow-up with neurosurgery as outpatient.  Started on aspirin and Plavix.  Discharged in hemodynamically stable condition to follow-up with PCP to review echo results.  After 21 days to stop Plavix and continue aspirin.  Referred to neurology and neurosurgery as outpatient.     Interval follow-up: Seen and examined, no acute distress, no acute major events.  No numbness or tingling.  Eager to go home.  No headache or neck trauma recently.  Reviewed MRI findings.  A1c at goal, LDL at goal.  Discussed follow-up with neurosurgery and neurology as outpatient.    Telemetry reviewed, no acute major make events        Day of Discharge     Vital Signs:  Temp:  [97.7 °F (36.5 °C)-98 °F (36.7 °C)] 97.7 °F (36.5 °C)  Heart Rate:  [57-69] 61  Resp:  [16-20] 20  BP: (126-163)/(72-97) 135/91    Review of systems:  All systems reviewed negative except for weakness, fatigue    Physical exam:   Constitutional: Awake, alert, no acute distress   Eyes: Pupils equal, sclerae anicteric, no conjunctival injection   HENT: NCAT, mucous membranes moist   Neck: Supple, no thyromegaly, no lymphadenopathy, trachea midline   Respiratory: Clear to auscultation bilaterally, nonlabored respirations    Cardiovascular: RRR, no murmurs, rubs, or gallops, palpable pedal pulses bilaterally   Gastrointestinal: Positive bowel sounds, soft, nontender, nondistended   Musculoskeletal: No bilateral ankle edema, no clubbing or cyanosis to extremities   Psychiatric:  Appropriate affect, cooperative   Neurologic: Oriented x 3, strength symmetric in all extremities, Cranial Nerves grossly intact to confrontation, speech clear   Skin: No rashes visible on exposed skin    Discharge Details        Discharge Medications        New Medications        Instructions Start Date   aspirin 81 MG chewable tablet   81 mg, Oral, Daily   Start Date: May 11, 2024     clopidogrel 75 MG tablet  Commonly known as: PLAVIX   75 mg, Oral, Daily   Start Date: May 11, 2024            Continue These Medications        Instructions Start Date   acetaminophen 650 MG 8 hr tablet  Commonly known as: TYLENOL   650 mg, Oral, Daily      amLODIPine 10 MG tablet  Commonly known as: NORVASC   10 mg, Oral, Daily      atorvastatin 40 MG tablet  Commonly known as: LIPITOR   40 mg, Oral, Daily      cetirizine 10 MG tablet  Commonly known as: zyrTEC   10 mg, Oral, Daily      famotidine 20 MG tablet  Commonly known as: PEPCID   20 mg, Oral, 2 Times Daily      fish oil 1000 MG capsule capsule   1,000 mg, Oral, Daily      losartan 25 MG tablet  Commonly known as: COZAAR   25 mg, Oral, Daily      metFORMIN  MG 24 hr tablet  Commonly known as: GLUCOPHAGE-XR   500 mg, Oral, 2 Times Daily      Ozempic (0.25 or 0.5 MG/DOSE) 2 MG/3ML solution pen-injector  Generic drug: Semaglutide(0.25 or 0.5MG/DOS)   0.25 mg, Subcutaneous, Weekly, Tuesday             Stop These Medications      diclofenac 50 MG EC tablet  Commonly known as: VOLTAREN              No Known Allergies    Discharge Disposition:  Home or Self Care    Diet:  Hospital:  Diet Order   Procedures    Diet: Cardiac, Diabetic; Healthy Heart (2-3 Na+); Consistent Carbohydrate; Fluid Consistency: Thin (IDDSI 0)       Discharge Activity:       CODE STATUS:  Code Status and Medical Interventions:   Ordered at: 05/10/24 3346     Level Of Support Discussed With:    Patient     Code Status (Patient has no pulse and is not breathing):    CPR (Attempt to Resuscitate)      Medical Interventions (Patient has pulse or is breathing):    Full Support         No future appointments.    Additional Instructions for the Follow-ups that You Need to Schedule       Discharge Follow-up with PCP   As directed       Currently Documented PCP:    Inez Engel APRN    PCP Phone Number:    [phone]     Follow Up Details: 3 to 7 days                Pertinent  and/or Most Recent Results     PROCEDURES:   MRI Cervical Spine Without Contrast    Result Date: 5/10/2024   MRI CERVICAL SPINE WO CONTRAST-  Date of Exam: 5/10/2024 9:20 AM  Indication: left cervical radic, h/o injury- active paresthesias, r/o acute disc/compression; G45.9-Transient cerebral ischemic attack, unspecified.  Comparison: 5/9/2024 CT  Technique:  Routine multiplanar/multisequence sequence images of the cervical spine were obtained without contrast administration.    Findings: There is no evidence of fracture. No suspicious bony lesion. The craniocervical junction is intact. There is normal cervical spine alignment without significant listhesis.  The spinal cord is normal in signal and appearance. Included intracranial structures are unremarkable. Paraspinal soft tissues show no acute abnormality.  There is multilevel disc desiccation. Level-by-level details as follows:  C2-3: Mild posterior disc osteophyte complex. No significant canal or significant neural foraminal narrowing.  C3-4: Posterior disc osteophyte complex. No significant canal narrowing. Mild right neural foraminal narrowing.  C4-5: Posterior disc osteophyte complex. No significant canal narrowing. Mild to moderate left neural foraminal narrowing.  C5-6: Posterior disc osteophyte complex with facet arthropathy. No significant canal narrowing. Moderate to severe right and mild left neural foraminal narrowing.  C6-7: Posterior disc osteophyte complex with mild facet arthropathy. No significant canal or neural foraminal narrowing.  C7-T1: Posterior disc osteophyte  complex with no significant canal or significant neural foraminal narrowing.      Impression: No acute abnormality of the cervical spine.  Multilevel degenerative changes without significant canal narrowing. Varying multilevel neural foraminal narrowing, moderate to severe on the right at C5-6. Level-by-level details as above.    Electronically Signed By-Guru Shin MD On:5/10/2024 10:14 AM      MRI Brain Without Contrast    Result Date: 5/10/2024  EXAMINATION: MRI BRAIN WO CONTRAST-  DATE OF EXAM: 5/10/2024 7:00 AM  INDICATION: TIA, Left sided numbness and tingling; G45.9-Transient cerebral ischemic attack, unspecified.  COMPARISON: Head CT 5/9/2024  TECHNIQUE: Multiplanar multisequence images of the brain were performed without contrast according to routine brain MRI protocol.  FINDINGS: Negative for areas of diffusion restriction to suggest a recent infarct. Negative for acute hemorrhage, large mass lesion, midline shift or hydrocephalus. Parenchymal volume appears normal for age. Minimal periventricular T2/FLAIR hyperintense signal suggestive of chronic microvascular ischemic change. No large extra-axial collection. Midline structures within normal limits. Orbits symmetric. Major intracranial flow voids are preserved. No obstructive sinus disease. No large mastoid effusion. No suspicious areas of susceptibility artifact.       No acute MRI findings. Specifically no findings to suggest a recent infarct.     Electronically Signed By-Ho Rodriguez MD On:5/10/2024 8:33 AM      CT Angiogram Neck    Result Date: 5/10/2024  CT ANGIOGRAM NECK-, CT ANGIOGRAM HEAD-  (COMBINED REPORT)  Date of Exam: 5/9/2024 10:43 PM  Indication: Left facial and upper extremity numbness.  Comparison: 5/9/2024.  RAPID:  CTA imaging was analyzed using RAPID AI to enable computer assisted triage notification to rapidly detect a large vessel occlusion (LVO) and shorten notification time.  Technique: CTA exams of the head and neck were  performed before and after the uneventful intravenous administration of 90 mL of Isovue-370 intravenous contrast agent. Reconstructed 2D coronal and sagittal images were also obtained. In addition, a 3-D volume rendered image was created for interpretation. Automated exposure control and iterative reconstruction methods were used. There is motion artifact on these exams. There is venous contamination of the study, especially obscuring the left subclavian artery. There is streak artifact related to dental amalgam, which also obscures detail on the study.  Findings:  HEAD CTA: No emergent large vessel occlusion. No cerebral aneurysm. The vertebral arteries are codominant. There is calcified atherosclerotic plaque involving the intradural (V4 segment) left vertebral artery without suspected hemodynamically significant arterial luminal stenosis by CTA. This finding is seen on image 68 of series 602 and adjacent images. Please see the nonenhanced head CT exam report from 5/9/2024 for further detail regarding additional intracranial findings.  NECK CTA: No emergent large vessel occlusion. No arterial dissection. The vertebral arteries are patent and codominant. Mild calcified and noncalcified atherosclerotic plaque is centered at the bilateral common carotid artery bifurcations. No hemodynamically significant arterial stenoses are seen. Degenerative changes involve the imaged spine. Overall, the degenerative changes within the cervical spine are greater on the right than the left with overall greater right-sided neural foraminal narrowing.       (COMBINED) No emergent large vessel occlusion.    Please note that portions of this note were completed with a voice recognition program.       Electronically Signed By-Wayne Drew MD On:5/10/2024 1:23 AM      CT Angiogram Head    Result Date: 5/10/2024  CT ANGIOGRAM NECK-, CT ANGIOGRAM HEAD-  (COMBINED REPORT)  Date of Exam: 5/9/2024 10:43 PM  Indication: Left facial and upper  extremity numbness.  Comparison: 5/9/2024.  RAPID:  CTA imaging was analyzed using RAPID AI to enable computer assisted triage notification to rapidly detect a large vessel occlusion (LVO) and shorten notification time.  Technique: CTA exams of the head and neck were performed before and after the uneventful intravenous administration of 90 mL of Isovue-370 intravenous contrast agent. Reconstructed 2D coronal and sagittal images were also obtained. In addition, a 3-D volume rendered image was created for interpretation. Automated exposure control and iterative reconstruction methods were used. There is motion artifact on these exams. There is venous contamination of the study, especially obscuring the left subclavian artery. There is streak artifact related to dental amalgam, which also obscures detail on the study.  Findings:  HEAD CTA: No emergent large vessel occlusion. No cerebral aneurysm. The vertebral arteries are codominant. There is calcified atherosclerotic plaque involving the intradural (V4 segment) left vertebral artery without suspected hemodynamically significant arterial luminal stenosis by CTA. This finding is seen on image 68 of series 602 and adjacent images. Please see the nonenhanced head CT exam report from 5/9/2024 for further detail regarding additional intracranial findings.  NECK CTA: No emergent large vessel occlusion. No arterial dissection. The vertebral arteries are patent and codominant. Mild calcified and noncalcified atherosclerotic plaque is centered at the bilateral common carotid artery bifurcations. No hemodynamically significant arterial stenoses are seen. Degenerative changes involve the imaged spine. Overall, the degenerative changes within the cervical spine are greater on the right than the left with overall greater right-sided neural foraminal narrowing.       (COMBINED) No emergent large vessel occlusion.    Please note that portions of this note were completed with a  voice recognition program.       Electronically Signed By-Wayne Drew MD On:5/10/2024 1:23 AM      CT Head Without Contrast    Result Date: 5/9/2024  CT HEAD WO CONTRAST-  Date of Exam: 5/9/2024 10:43 PM  Indication: weakness, headache. 58-year-old male with intermittent left-sided upper extremity tingling and numbing numbness.  Comparison: 1/3/2023.  Technique: Axial CT images were obtained of the head without contrast administration.  Reconstructed 2D coronal and sagittal images were also obtained. Automated exposure control and iterative construction methods were used.  Findings: A routine nonenhanced head CT was performed. No acute brain abnormality is seen. No acute infarct. No acute intracranial hemorrhage. No midline shift or acute intracranial mass effect is seen. The ventricular size and configuration are within normal limits. No acute skull fracture is identified. Mild prominence of the extra-axial spaces is seen, as before. Arterial calcifications are identified. There may be minimal chronic small vessel disease. There is chronic leftward nasal septal deviation. No acute orbital findings are appreciated. Mild age-indeterminate mucosal thickening involves the imaged paranasal sinuses.  No air-fluid interfaces are seen within the imaged paranasal sinuses. The imaged middle ear clefts (that is, the bilateral mastoid air cell complexes, bilateral middle ear cavities, and bilateral external auditory canals) are well aerated. Probably no significant change in the small scattered suboccipital lymph nodes.      No acute brain abnormality is seen.       Please note that portions of this note were completed with a voice recognition program.       Electronically Signed By-Wayne Drew MD On:5/9/2024 11:33 PM      XR Chest 1 View    Result Date: 5/9/2024  XR CHEST 1 VW-  Date of Exam: 5/9/2024 9:48 PM  Indication: Weak/Dizzy/AMS triage protocol  Comparison: Chest AP dated 1/3/2023  Findings: The lungs are clear  bilaterally. The cardiac mediastinal silhouettes appear normal. No effusion is seen.      Impression: 1.  No acute cardiopulmonary disease.   Electronically Signed By-Wilbert Colón MD On:5/9/2024 10:39 PM         LAB RESULTS:      Lab 05/10/24  0412 05/09/24 2132   WBC 6.89 8.05   HEMOGLOBIN 13.3 14.3   HEMATOCRIT 38.6 40.6   PLATELETS 307 308   NEUTROS ABS 3.78 4.34   IMMATURE GRANS (ABS) 0.03 0.01   LYMPHS ABS 2.27 2.81   MONOS ABS 0.54 0.68   EOS ABS 0.18 0.14   MCV 85.4 84.2         Lab 05/10/24  0412 05/09/24 2132   SODIUM 137 140   POTASSIUM 3.5 3.6   CHLORIDE 104 104   CO2 22.8 24.6   ANION GAP 10.2 11.4   BUN 14 14   CREATININE 0.78 0.93   EGFR 104.0 95.8   GLUCOSE 156* 125*   CALCIUM 8.8 9.5   MAGNESIUM 1.8 1.9   PHOSPHORUS 3.2  --    HEMOGLOBIN A1C 7.40*  --          Lab 05/10/24  0412 05/09/24 2132   TOTAL PROTEIN 6.4 7.1   ALBUMIN 3.9 4.3   GLOBULIN 2.5 2.8   ALT (SGPT) 33 35   AST (SGOT) 25 25   BILIRUBIN 0.4 0.6   ALK PHOS 82 85         Lab 05/09/24 2132   HSTROP T <6         Lab 05/10/24  0412   CHOLESTEROL 117   LDL CHOL 59   HDL CHOL 30*   TRIGLYCERIDES 162*             Brief Urine Lab Results  (Last result in the past 365 days)        Color   Clarity   Blood   Leuk Est   Nitrite   Protein   CREAT   Urine HCG        05/09/24 2143 Yellow   Clear   Negative   Negative   Negative   Negative                 Microbiology Results (last 10 days)       ** No results found for the last 240 hours. **            MRI Cervical Spine Without Contrast    Result Date: 5/10/2024  Impression: Impression: No acute abnormality of the cervical spine.  Multilevel degenerative changes without significant canal narrowing. Varying multilevel neural foraminal narrowing, moderate to severe on the right at C5-6. Level-by-level details as above.    Electronically Signed By-Guru Shin MD On:5/10/2024 10:14 AM      MRI Brain Without Contrast    Result Date: 5/10/2024  Impression: No acute MRI findings. Specifically no  findings to suggest a recent infarct.     Electronically Signed By-Ho Rodriguez MD On:5/10/2024 8:33 AM      CT Angiogram Neck    Result Date: 5/10/2024  Impression: (COMBINED) No emergent large vessel occlusion.    Please note that portions of this note were completed with a voice recognition program.       Electronically Signed ByMichael Drew MD On:5/10/2024 1:23 AM      CT Angiogram Head    Result Date: 5/10/2024  Impression: (COMBINED) No emergent large vessel occlusion.    Please note that portions of this note were completed with a voice recognition program.       Electronically Signed ByMichael Drew MD On:5/10/2024 1:23 AM      CT Head Without Contrast    Result Date: 5/9/2024  Impression: No acute brain abnormality is seen.       Please note that portions of this note were completed with a voice recognition program.       Electronically Signed ByMichael Drew MD On:5/9/2024 11:33 PM      XR Chest 1 View    Result Date: 5/9/2024  Impression: Impression: 1.  No acute cardiopulmonary disease.   Electronically Signed By-Wilbert Colón MD On:5/9/2024 10:39 PM                   Labs Pending at Discharge:        Time spent on Discharge including face to face service:  35 minutes    Electronically signed by Nando Coles MD, 05/10/24, 4:08 PM EDT.    Portions of this documentation were transcribed electronically from a voice recognition software.  I confirm all data accurately represents the service(s) I performed at today's visit.

## 2024-05-10 NOTE — H&P
[facility] HISTORY AND PHYSICAL  Date: 5/10/2024   Patient Name: Aly Elise  : 1965  MRN: [id number]  Primary Care Physician:  Inez Engel APRN  Date of admission: 2024    Subjective   Subjective     Chief Complaint: Left-sided numbness and tingling    HPI:    Aly Elise is a 58 y.o. male with a past medical history of hypertension, hyperlipidemia, diabetes mellitus, sleep apnea presented to the ED for evaluation of dizziness, left- facial numbness, left upper and lower extremity numbness and tingling sensation that started around 1 PM.  Patient had intermittent episodes of these and resolving.  Had multiple episode since 1 PM yesterday.  The longest has been for 1 hour and has self resolved.  Patient took 2 baby aspirin's in the home.  Patient had another similar episode while the patient was in the CT and has improved.  Numbness and tingling is more prominent in the left chest and left abdomen and left arm compared to the rest of the left side of the body.  Denies any weakness, nausea, vomiting, vision changes, headache, palpitations.  No similar episodes in the past.    In the ED, vital signs Temperature 98, pulse 65, respiratory 18, blood pressure 153/79 on room air saturating around 97%.  Labs showed normal CMP, normal CBC, urinalysis is noninfectious.  CT head without contrast showed no acute abnormality.  CTA head and neck showed no emergent large vessel occlusion.  Chest x-ray showed no acute cardiopulmonary process.  Received aspirin and Plavix in the ED.  Patient has been admitted for further evaluation and management of intermittent left sided numbness and tingling, concern for TIA    Personal History     Past Medical History:   Diagnosis Date    Diabetes mellitus     High blood pressure     High cholesterol     Hyperlipidemia     Hypertension     Kidney stones     Rectal bleeding     Sleep apnea     uses CPAP    Vitamin D deficiency          Past Surgical  History:   Procedure Laterality Date    COLONOSCOPY N/A 12/6/2021    Procedure: COLONOSCOPY WITH POLYPECTOMY/SNARE;  Surgeon: Jersey Greene MD;  Location: [facility] ENDOSCOPY;  Service: General;  Laterality: N/A;  COLON POLYPS    KIDNEY STONE SURGERY      TONSILLECTOMY           Family History   Problem Relation Age of Onset    Prostate cancer Father     Prostate cancer Paternal Grandfather     Malig Hyperthermia Neg Hx          Social History     Socioeconomic History    Marital status:    Tobacco Use    Smoking status: Never    Smokeless tobacco: Never   Vaping Use    Vaping status: Never Used   Substance and Sexual Activity    Alcohol use: Never    Drug use: Never    Sexual activity: Defer         Home Medications:  Diclofenac, Omega-3 Fatty Acids, Semaglutide(0.25 or 0.5MG/DOS), acetaminophen, amLODIPine, atorvastatin, cetirizine, famotidine, losartan, and metFORMIN ER    Allergies:  No Known Allergies    Review of Systems   All other systems reviewed and negative except as mentioned above in the HPI    Objective   Objective     Vitals:   Temp:  [98 °F (36.7 °C)] 98 °F (36.7 °C)  Heart Rate:  [57-69] 60  Resp:  [18] 18  BP: (126-153)/(72-96) 145/88    Physical Exam    Constitutional: Awake, alert, no acute distress   Eyes: Pupils equal, sclerae anicteric, no conjunctival injection   HENT: NCAT, mucous membranes moist   Respiratory: Clear to auscultation bilaterally, nonlabored respirations    Cardiovascular: RRR, no murmurs, rubs, or gallops, palpable pedal pulses bilaterally   Gastrointestinal: Positive bowel sounds, soft, nontender, nondistended   Musculoskeletal: No bilateral ankle edema, no clubbing or cyanosis to extremities   Psychiatric: Appropriate affect, cooperative   Neurologic: Oriented x 3, strength 5/5 bilateral upper and lower extremities, sensation intact and equal bilaterally with light touch, cranial nerves II to XII intact, speech clear   Skin: No rashes     Result Review    Result  Review:  I have personally reviewed the results from the time of this admission to 5/10/2024 03:36 EDT and agree with these findings:  [x]  Laboratory  []  Microbiology  [x]  Radiology  [x]  EKG/Telemetry   []  Cardiology/Vascular   []  Pathology  []  Old records  []  Other:        Imaging Results (Last 24 Hours)       Procedure Component Value Units Date/Time    CT Angiogram Neck [[id number]] Collected: 05/10/24 0108     Updated: 05/10/24 0125    Narrative:      CT ANGIOGRAM NECK-, CT ANGIOGRAM HEAD-     (COMBINED REPORT)     Date of Exam: 5/9/2024 10:43 PM     Indication: Left facial and upper extremity numbness.     Comparison: 5/9/2024.     RAPID:    CTA imaging was analyzed using RAPID AI to enable computer assisted  triage notification to rapidly detect a large vessel occlusion (LVO) and  shorten notification time.     Technique:   CTA exams of the head and neck were performed before and after the  uneventful intravenous administration of 90 mL of Isovue-370 intravenous  contrast agent. Reconstructed 2D coronal and sagittal images were also  obtained. In addition, a 3-D volume rendered image was created for  interpretation. Automated exposure control and iterative reconstruction  methods were used. There is motion artifact on these exams. There is  venous contamination of the study, especially obscuring the left  subclavian artery. There is streak artifact related to dental amalgam,  which also obscures detail on the study.     Findings:     HEAD CTA: No emergent large vessel occlusion. No cerebral aneurysm. The  vertebral arteries are codominant. There is calcified atherosclerotic  plaque involving the intradural (V4 segment) left vertebral artery  without suspected hemodynamically significant arterial luminal stenosis  by CTA. This finding is seen on image 68 of series 602 and adjacent  images. Please see the nonenhanced head CT exam report from 5/9/2024 for  further detail regarding additional intracranial  findings.      NECK CTA: No emergent large vessel occlusion. No arterial dissection.  The vertebral arteries are patent and codominant. Mild calcified and  noncalcified atherosclerotic plaque is centered at the bilateral common  carotid artery bifurcations. No hemodynamically significant arterial  stenoses are seen. Degenerative changes involve the imaged spine.  Overall, the degenerative changes within the cervical spine are greater  on the right than the left with overall greater right-sided neural  foraminal narrowing.          Impression:      (COMBINED)  No emergent large vessel occlusion.           Please note that portions of this note were completed with a voice  recognition program.                    Electronically Signed By-Wayne Drew MD On:5/10/2024 1:23 AM       CT Angiogram Head [[id number]] Collected: 05/10/24 0108     Updated: 05/10/24 0125    Narrative:      CT ANGIOGRAM NECK-, CT ANGIOGRAM HEAD-     (COMBINED REPORT)     Date of Exam: 5/9/2024 10:43 PM     Indication: Left facial and upper extremity numbness.     Comparison: 5/9/2024.     RAPID:    CTA imaging was analyzed using RAPID AI to enable computer assisted  triage notification to rapidly detect a large vessel occlusion (LVO) and  shorten notification time.     Technique:   CTA exams of the head and neck were performed before and after the  uneventful intravenous administration of 90 mL of Isovue-370 intravenous  contrast agent. Reconstructed 2D coronal and sagittal images were also  obtained. In addition, a 3-D volume rendered image was created for  interpretation. Automated exposure control and iterative reconstruction  methods were used. There is motion artifact on these exams. There is  venous contamination of the study, especially obscuring the left  subclavian artery. There is streak artifact related to dental amalgam,  which also obscures detail on the study.     Findings:     HEAD CTA: No emergent large vessel occlusion. No  cerebral aneurysm. The  vertebral arteries are codominant. There is calcified atherosclerotic  plaque involving the intradural (V4 segment) left vertebral artery  without suspected hemodynamically significant arterial luminal stenosis  by CTA. This finding is seen on image 68 of series 602 and adjacent  images. Please see the nonenhanced head CT exam report from 5/9/2024 for  further detail regarding additional intracranial findings.      NECK CTA: No emergent large vessel occlusion. No arterial dissection.  The vertebral arteries are patent and codominant. Mild calcified and  noncalcified atherosclerotic plaque is centered at the bilateral common  carotid artery bifurcations. No hemodynamically significant arterial  stenoses are seen. Degenerative changes involve the imaged spine.  Overall, the degenerative changes within the cervical spine are greater  on the right than the left with overall greater right-sided neural  foraminal narrowing.          Impression:      (COMBINED)  No emergent large vessel occlusion.           Please note that portions of this note were completed with a voice  recognition program.                    Electronically Signed By-Wayne Drew MD On:5/10/2024 1:23 AM       CT Head Without Contrast [[id number]] Collected: 05/09/24 2327     Updated: 05/09/24 2335    Narrative:      CT HEAD WO CONTRAST-     Date of Exam: 5/9/2024 10:43 PM     Indication: weakness, headache. 58-year-old male with intermittent  left-sided upper extremity tingling and numbing numbness.     Comparison: 1/3/2023.     Technique:   Axial CT images were obtained of the head without contrast  administration.  Reconstructed 2D coronal and sagittal images were also  obtained. Automated exposure control and iterative construction methods  were used.     Findings:  A routine nonenhanced head CT was performed. No acute brain abnormality  is seen. No acute infarct. No acute intracranial hemorrhage. No midline  shift or acute  intracranial mass effect is seen. The ventricular size  and configuration are within normal limits. No acute skull fracture is  identified. Mild prominence of the extra-axial spaces is seen, as  before. Arterial calcifications are identified. There may be minimal  chronic small vessel disease. There is chronic leftward nasal septal  deviation. No acute orbital findings are appreciated. Mild  age-indeterminate mucosal thickening involves the imaged paranasal  sinuses.  No air-fluid interfaces are seen within the imaged paranasal  sinuses. The imaged middle ear clefts (that is, the bilateral mastoid  air cell complexes, bilateral middle ear cavities, and bilateral  external auditory canals) are well aerated. Probably no significant  change in the small scattered suboccipital lymph nodes.       Impression:      No acute brain abnormality is seen.                  Please note that portions of this note were completed with a voice  recognition program.                    Electronically Signed By-Wayne Drew MD On:5/9/2024 11:33 PM       XR Chest 1 View [[id number]] Collected: 05/09/24 2238     Updated: 05/09/24 2241    Narrative:      XR CHEST 1 VW-     Date of Exam: 5/9/2024 9:48 PM     Indication: Weak/Dizzy/AMS triage protocol     Comparison: Chest AP dated 1/3/2023     Findings:  The lungs are clear bilaterally. The cardiac mediastinal silhouettes  appear normal. No effusion is seen.       Impression:      Impression:  1.  No acute cardiopulmonary disease.        Electronically Signed By-Wilbert Colón MD On:5/9/2024 10:39 PM                aspirin, 81 mg, Oral, Daily   Or  aspirin, 300 mg, Rectal, Daily  atorvastatin, 80 mg, Oral, Nightly  clopidogrel, 75 mg, Oral, Daily  heparin (porcine), 5,000 Units, Subcutaneous, Q8H  insulin lispro, 2-7 Units, Subcutaneous, 4x Daily AC & at Bedtime  sodium chloride, 10 mL, Intravenous, Q12H         Assessment & Plan   Assessment / Plan     Assessment/Plan:   Intermittent  episodes of of left facial numbness, left upper and left lower extremity numbness, and tingling-currently resolved  Concern for TIA  Hypertension  Hyperlipidemia  Type 2 diabetes mellitus  Sleep apnea    Plan  Admit to observation, telemetry  Neurochecks per protocol  MRI brain without contrast  Permissive hypertension for next 24 hours  Teleneurology consult in the a.m.  Continue aspirin 81 mg daily, Plavix 75 mg daily for 21 days  A1c  Lipid panel  Cardiac echo  Nursing dysphagia screen  Heart healthy, consistent carb diet when appropriate  Low-dose sliding scale insulin  Resume other appropriate home medications once reconciled    DVT prophylaxis:  Medical and mechanical DVT prophylaxis orders are present.    CODE STATUS:    Level Of Support Discussed With: Patient  Code Status (Patient has no pulse and is not breathing): CPR (Attempt to Resuscitate)  Medical Interventions (Patient has pulse or is breathing): Full Support      Admission Status:  I believe this patient meets observation   status.    Part of this note may be an electronic transcription/translation of spoken language to printed text using the Dragon Dictation System    Shar Patterson MD

## 2024-05-10 NOTE — CONSULTS
"[facility]  [facility] Tele[facility]urology Consult Services    Routine Consult New    Patient Name:   Aly Elise  YOB: 1965  Identification Number:   MRN - [id number]  Date of Service:   05/10/2024 08:20:56    Diagnosis        R20.2 - Paresthesia of skin    Impression  DDX includes TIA vs left cervical radiculopathy- he has multiple stroke RF including DM, HTN, BECKY, and obesity- recommend to treat as TIA for now with DAPT x 21 days, goal LDL <70, goal A1c <7%, compliance with BECKY treatment; MRI c-spine obtained to r/o acute disc that would alter management- if not prominent can have him f/u with neurology for EMG/NCS to look for active changes as well as referral to neurosurgery for discussion; If there are any severe changes noted or acute disc please consult neurosurgery while inpatient; Otherwise upon discharge needing 30 day holter and meds as noted above. Today's exam very reassuring.  If MRI c-spine negative for acute findings we will sign off.    Our recommendations are outlined below    Disposition :  Neurology will follow    ----------------------------------------------------------------------------------------------------    Imaging  CT C-spine from 1/2023:  IMPRESSION:    1. No acute fracture or osseous malalignment.  2. Multilevel degenerative disc disease most notably at C5-C6 and C6-C7. Small posterior disc  osteophyte complexes cause mild spinal canal stenosis at C6-C7.    Chief Complaint:  Left sided numnbess    History of Present Illness:  57 yo male here with family with report of recurrent episodes of left sided numbness starting at 1pm yesterday that would come and go- felt it mostly in his left chest/trunk regions and into his left neck, he did feel it in his lower jaw/\"face\" region; has not returned today; reports never had this before however in notes he does have episode of injury in the past with neck pain and left hand/arm pain/numbness for which he had CT " c-spine performed       Past Medical History:       Hypertension       Diabetes Mellitus       There is no history of Stroke    Medications:    No Anticoagulant use   No Antiplatelet use  Reviewed EMR for current medications    Allergies:   Reviewed    Social History:  Patient Is:   Smoking: No  Alcohol Use: No  Drug Use: No    Family History:    There is no family history of premature cerebrovascular disease pertinent to this consultation    ROS :  14 Points Review of Systems was performed and was negative except mentioned in HPI.    Past Surgical History:  There Is No Surgical History Contributory To Today’s Visit    Examination  1A: Level of Consciousness - Alert; keenly responsive + 0  1B: Ask Month and Age - Both Questions Right + 0  1C: Blink Eyes & Squeeze Hands - Performs Both Tasks + 0  2: Test Horizontal Extraocular Movements - Normal + 0  3: Test Visual Fields - No Visual Loss + 0  4: Test Facial Palsy (Use Grimace if Obtunded) - Normal symmetry + 0  5A: Test Left Arm Motor Drift - No Drift for 10 Seconds + 0  5B: Test Right Arm Motor Drift - No Drift for 10 Seconds + 0  6A: Test Left Leg Motor Drift - No Drift for 5 Seconds + 0  6B: Test Right Leg Motor Drift - No Drift for 5 Seconds + 0  7: Test Limb Ataxia (FNF/Heel-Shin) - No Ataxia + 0  8: Test Sensation - Normal; No sensory loss + 0  9: Test Language/Aphasia - Normal; No aphasia + 0  10: Test Dysarthria - Normal + 0  11: Test Extinction/Inattention - No abnormality + 0    NIHSS Score: 0           Patient/Family was informed the Neurology Consult would happen via TeleHealth consult by way of interactive audio and video telecommunications and consented to receiving care in this manner.    Telehealth Neurology consultation was provided. I spent 35 minutes providing telehealth care. This includes time spent for face to face visit via telemedicine, review of medical records, imaging studies and discussion of findings with providers, the patient  and/or family.      Dr Vianney Michel      [facility]  For Inpatient follow-up with [facility] physician please call [facility] [phone]. This is not an outpatient service. Post hospital discharge, please contact hospital directly.    Please do not communicate with [facility] physicians via secure chat. If you have any questions, Please contact [facility].  Please call or reconsult our service if there are any clinical or diagnostic changes.

## 2024-05-10 NOTE — PLAN OF CARE
Goal Outcome Evaluation:  Plan of Care Reviewed With: patient        Progress: improving  Outcome Evaluation: OT evaluation completed with no additional OT services recommended.  Patient states his symptoms have resolved with no further numbness or tingling on his left side.  Patient is independent with all self-care and functional transfers      Anticipated Discharge Disposition (OT): home

## 2024-05-10 NOTE — CONSULTS
Met with patient and wife at bedside. Patient was asleep, spoke with wife. Discussed current A1c of 7.4% with an estimated average glucose of 166 mg/dl with our goal A1c beign less than 7%. Wife states that he has been working on lowering his blood sugars but his lifestyle and profession make it more difficult. Patient's diabetes is managed by his PCP and takes Metformin and Ozempic at home. Will Gambell back to patient room when patient is awake, time permitting. No questions or needs at this time.

## 2024-05-10 NOTE — ED TRIAGE NOTES
Pt arrives to ED with complaints of dizziness, left sided weakness and tingling.. pt symptoms started around 1pm.. pt stated he took a couple of aspirins after symptoms started

## 2024-05-11 ENCOUNTER — READMISSION MANAGEMENT (OUTPATIENT)
Dept: CALL CENTER | Facility: HOSPITAL | Age: 59
End: 2024-05-11
Payer: COMMERCIAL

## 2024-05-11 LAB
QT INTERVAL: 409 MS
QTC INTERVAL: 426 MS

## 2024-06-05 ENCOUNTER — OFFICE VISIT (OUTPATIENT)
Dept: NEUROSURGERY | Facility: CLINIC | Age: 59
End: 2024-06-05
Payer: COMMERCIAL

## 2024-06-05 VITALS
WEIGHT: 266 LBS | SYSTOLIC BLOOD PRESSURE: 145 MMHG | BODY MASS INDEX: 35.25 KG/M2 | HEART RATE: 74 BPM | HEIGHT: 73 IN | DIASTOLIC BLOOD PRESSURE: 74 MMHG

## 2024-06-05 DIAGNOSIS — M50.221 HERNIATED NUCLEUS PULPOSUS, C4-5 LEFT: ICD-10-CM

## 2024-06-05 DIAGNOSIS — M50.222 HERNIATED NUCLEUS PULPOSUS, C5-6 RIGHT: Primary | ICD-10-CM

## 2024-06-05 NOTE — PROGRESS NOTES
"Chief Complaint  Numbness    Subjective          Aly Elise who is a 58 y.o. year old male who presents to [facility] for Evaluation of the Spine.     He reports an episode of numbness in the entire left side of the body with some dizziness on 5/10/24 and spent the night in the hospital. They were initially concerned about TIA.    More recently he has had numbness in the left arm from the elbow to shoulder, and the left stomach and torso.    He denies pain in the neck pain or radiating arm pain. He denies weakness in the arms. Denies loss of bowel or bladder control.    Was this the result of an injury or accident? : No    History of Previous Spinal Surgery?: No     reports that he has never smoked. He has never used smokeless tobacco.    Review of Systems   Neurological:  Positive for numbness.        Objective   Vital Signs:   /74 (BP Location: Right arm, Patient Position: Sitting, Cuff Size: Adult)   Pulse 74   Ht 185.4 cm (73\")   Wt 121 kg (266 lb)   BMI 35.09 kg/m²       Physical Exam  Constitutional:       Appearance: Normal appearance. He is obese.   Pulmonary:      Effort: Pulmonary effort is normal.   Musculoskeletal:         General: No tenderness.      Cervical back: Normal range of motion.      Comments: Gay's negative bilaterally,  Tinel's testing negative at the left wrist and elbow   Neurological:      General: No focal deficit present.      Mental Status: He is alert and oriented to person, place, and time.      Sensory: No sensory deficit.      Motor: No weakness.      Deep Tendon Reflexes: Reflexes normal.   Psychiatric:         Mood and Affect: Mood normal.         Behavior: Behavior normal.        Neurologic Exam     Mental Status   Oriented to person, place, and time.        Result Review     I have personally interpreted the MRI of cervical spine without contrast from 5/10/2024 which shows multilevel spondylosis worse at C5-C6 where " there is moderate to severe right and mild left foraminal narrowing.  To moderate left foraminal narrowing at C4-C5.  There is mild right foraminal narrowing at C3-C4.  Otherwise there is not significant central canal or foraminal stenosis.     Assessment and Plan    Diagnoses and all orders for this visit:    1. Herniated nucleus pulposus, C5-6 right (Primary)    2. Herniated nucleus pulposus, C4-5 left    Stenosis is worse on the right at C5-C6 and is at least moderate.    There is no convincing spinal cord compression or spinal cord signal change.    I don't expect that his diffuse left side numbness complaints are explained by the findings on the cervical MRI. I do not expect a surgical approach in the cervical spine to be indicated unless he develops left arm pain in a C6 pattern to the 1st and 2nd finger.    He may technically be a candidate for HORACE trial, but I don't expect this to help complaints outside of the arms and neck.    I would consider neurology consultation - he is scheduled for one currently.    The patient was counseled on basic recommendations for the reduction and prevention of back, neck, or spine pain in association with spinal disorders, including: cessation/avoidance of nicotine use, maintenance of a healthy BMI and weight, focusing on building/maintaining core strength through core exercise, and avoidance of activities which worsen the pain. The patient will monitor for changes in symptoms and notify our clinic of these changes as needed.    He will follow-up here PRN.    Follow Up   Return if symptoms worsen or fail to improve.  Patient was given instructions and counseling regarding his condition or for health maintenance advice. Please see specific information pulled into the AVS if appropriate.

## 2024-06-06 ENCOUNTER — PATIENT ROUNDING (BHMG ONLY) (OUTPATIENT)
Dept: NEUROSURGERY | Facility: CLINIC | Age: 59
End: 2024-06-06
Payer: COMMERCIAL

## (undated) DEVICE — THE SINGLE USE ETRAP – POLYP TRAP IS USED FOR SUCTION RETRIEVAL OF ENDOSCOPICALLY REMOVED POLYPS.: Brand: ETRAP

## (undated) DEVICE — PENCL E/S SMOKEEVAC W/TELESCP CANN

## (undated) DEVICE — SOL IRRG H2O PL/BG 1000ML STRL

## (undated) DEVICE — Device: Brand: DEFENDO AIR/WATER/SUCTION AND BIOPSY VALVE

## (undated) DEVICE — COLON KIT: Brand: MEDLINE INDUSTRIES, INC.

## (undated) DEVICE — SNAR POLYP CAPTIFLEX XS/OVL 11X2.4MM 240CM 1P/U